# Patient Record
Sex: FEMALE | Race: BLACK OR AFRICAN AMERICAN | NOT HISPANIC OR LATINO | Employment: UNEMPLOYED | URBAN - METROPOLITAN AREA
[De-identification: names, ages, dates, MRNs, and addresses within clinical notes are randomized per-mention and may not be internally consistent; named-entity substitution may affect disease eponyms.]

---

## 2023-05-23 ENCOUNTER — APPOINTMENT (OUTPATIENT)
Dept: PRIMARY CARE | Facility: CLINIC | Age: 73
End: 2023-05-23
Payer: MEDICARE

## 2023-08-02 PROBLEM — Z86.39 H/O: OBESITY: Status: ACTIVE | Noted: 2023-08-02

## 2023-08-02 PROBLEM — K59.00 CONSTIPATION: Status: ACTIVE | Noted: 2023-08-02

## 2023-08-02 PROBLEM — R51.9 HEADACHE, ACUTE: Status: ACTIVE | Noted: 2023-08-02

## 2023-08-02 PROBLEM — R00.1 BRADYCARDIA WITH 31-40 BEATS PER MINUTE: Status: ACTIVE | Noted: 2023-08-02

## 2023-08-02 PROBLEM — E87.1 HYPONATREMIA: Status: ACTIVE | Noted: 2023-08-02

## 2023-08-02 PROBLEM — M62.81 MUSCLE WEAKNESS: Status: ACTIVE | Noted: 2023-08-02

## 2023-08-02 PROBLEM — H25.13 CATARACT, NUCLEAR SCLEROTIC, BOTH EYES: Status: ACTIVE | Noted: 2023-08-02

## 2023-08-02 PROBLEM — M26.69 TMJ CREPITUS: Status: ACTIVE | Noted: 2023-08-02

## 2023-08-02 PROBLEM — R00.2 PALPITATIONS: Status: ACTIVE | Noted: 2023-08-02

## 2023-08-02 PROBLEM — I10 ESSENTIAL HYPERTENSION: Status: ACTIVE | Noted: 2023-08-02

## 2023-08-02 PROBLEM — N89.8 VAGINAL DRYNESS: Status: ACTIVE | Noted: 2023-08-02

## 2023-08-02 PROBLEM — H04.123 DRY EYE SYNDROME, BILATERAL: Status: ACTIVE | Noted: 2023-08-02

## 2023-08-02 PROBLEM — R93.1 ELEVATED CORONARY ARTERY CALCIUM SCORE: Status: ACTIVE | Noted: 2023-08-02

## 2023-08-02 PROBLEM — R10.30 LOWER ABDOMINAL PAIN OF UNKNOWN ETIOLOGY: Status: ACTIVE | Noted: 2023-08-02

## 2023-08-02 PROBLEM — I60.8: Status: ACTIVE | Noted: 2023-08-02

## 2023-08-02 PROBLEM — R26.2 DIFFICULTY WALKING: Status: ACTIVE | Noted: 2023-08-02

## 2023-08-02 PROBLEM — E78.2 MIXED HYPERLIPIDEMIA: Status: ACTIVE | Noted: 2023-08-02

## 2023-08-02 PROBLEM — I51.7 CARDIOMEGALY: Status: ACTIVE | Noted: 2023-08-02

## 2023-08-02 PROBLEM — H93.13 SUBJECTIVE TINNITUS OF BOTH EARS: Status: ACTIVE | Noted: 2023-08-02

## 2023-08-02 PROBLEM — D64.9 LOW HEMATOCRIT: Status: ACTIVE | Noted: 2023-08-02

## 2023-08-02 PROBLEM — R13.10 DYSPHAGIA: Status: ACTIVE | Noted: 2023-08-02

## 2023-08-02 PROBLEM — R42 VERTIGO: Status: ACTIVE | Noted: 2023-08-02

## 2023-08-02 PROBLEM — R09.A2 GLOBUS SENSATION: Status: ACTIVE | Noted: 2023-08-02

## 2023-08-02 RX ORDER — MULTIVIT-MIN/IRON/FOLIC ACID/K 18-600-40
CAPSULE ORAL
COMMUNITY
Start: 2020-01-14

## 2023-08-02 RX ORDER — LISINOPRIL 20 MG/1
TABLET ORAL
COMMUNITY
Start: 2020-09-22

## 2023-08-02 RX ORDER — POLYETHYLENE GLYCOL 3350 17 G/17G
POWDER, FOR SOLUTION ORAL
COMMUNITY
Start: 2019-12-12

## 2023-08-02 RX ORDER — AMLODIPINE BESYLATE 10 MG/1
TABLET ORAL
COMMUNITY
Start: 2019-12-20 | End: 2023-09-29 | Stop reason: SDUPTHER

## 2023-08-15 ENCOUNTER — OFFICE VISIT (OUTPATIENT)
Dept: PRIMARY CARE | Facility: CLINIC | Age: 73
End: 2023-08-15
Payer: MEDICARE

## 2023-08-15 VITALS
BODY MASS INDEX: 24.53 KG/M2 | HEART RATE: 68 BPM | DIASTOLIC BLOOD PRESSURE: 94 MMHG | WEIGHT: 147.2 LBS | OXYGEN SATURATION: 100 % | SYSTOLIC BLOOD PRESSURE: 178 MMHG | HEIGHT: 65 IN

## 2023-08-15 DIAGNOSIS — D70.9 NEUTROPENIA, UNSPECIFIED TYPE (CMS-HCC): ICD-10-CM

## 2023-08-15 DIAGNOSIS — I10 ESSENTIAL HYPERTENSION: Primary | ICD-10-CM

## 2023-08-15 DIAGNOSIS — Z13.220 NEED FOR LIPID SCREENING: ICD-10-CM

## 2023-08-15 LAB
ALANINE AMINOTRANSFERASE (SGPT) (U/L) IN SER/PLAS: 18 U/L (ref 7–45)
ALBUMIN (G/DL) IN SER/PLAS: 4.6 G/DL (ref 3.4–5)
ALKALINE PHOSPHATASE (U/L) IN SER/PLAS: 70 U/L (ref 33–136)
ANION GAP IN SER/PLAS: 13 MMOL/L (ref 10–20)
ASPARTATE AMINOTRANSFERASE (SGOT) (U/L) IN SER/PLAS: 20 U/L (ref 9–39)
BILIRUBIN TOTAL (MG/DL) IN SER/PLAS: 0.7 MG/DL (ref 0–1.2)
CALCIUM (MG/DL) IN SER/PLAS: 9.9 MG/DL (ref 8.6–10.6)
CARBON DIOXIDE, TOTAL (MMOL/L) IN SER/PLAS: 27 MMOL/L (ref 21–32)
CHLORIDE (MMOL/L) IN SER/PLAS: 104 MMOL/L (ref 98–107)
CHOLESTEROL (MG/DL) IN SER/PLAS: 231 MG/DL (ref 0–199)
CHOLESTEROL IN HDL (MG/DL) IN SER/PLAS: 71.8 MG/DL
CHOLESTEROL/HDL RATIO: 3.2
CREATININE (MG/DL) IN SER/PLAS: 0.82 MG/DL (ref 0.5–1.05)
GFR FEMALE: 76 ML/MIN/1.73M2
GLUCOSE (MG/DL) IN SER/PLAS: 80 MG/DL (ref 74–99)
LDL: 146 MG/DL (ref 0–99)
POTASSIUM (MMOL/L) IN SER/PLAS: 4.3 MMOL/L (ref 3.5–5.3)
PROTEIN TOTAL: 7.2 G/DL (ref 6.4–8.2)
SODIUM (MMOL/L) IN SER/PLAS: 140 MMOL/L (ref 136–145)
TRIGLYCERIDE (MG/DL) IN SER/PLAS: 68 MG/DL (ref 0–149)
UREA NITROGEN (MG/DL) IN SER/PLAS: 12 MG/DL (ref 6–23)
VLDL: 14 MG/DL (ref 0–40)

## 2023-08-15 PROCEDURE — 1160F RVW MEDS BY RX/DR IN RCRD: CPT | Performed by: STUDENT IN AN ORGANIZED HEALTH CARE EDUCATION/TRAINING PROGRAM

## 2023-08-15 PROCEDURE — 80061 LIPID PANEL: CPT

## 2023-08-15 PROCEDURE — 85025 COMPLETE CBC W/AUTO DIFF WBC: CPT

## 2023-08-15 PROCEDURE — 3077F SYST BP >= 140 MM HG: CPT | Performed by: STUDENT IN AN ORGANIZED HEALTH CARE EDUCATION/TRAINING PROGRAM

## 2023-08-15 PROCEDURE — 3080F DIAST BP >= 90 MM HG: CPT | Performed by: STUDENT IN AN ORGANIZED HEALTH CARE EDUCATION/TRAINING PROGRAM

## 2023-08-15 PROCEDURE — 1036F TOBACCO NON-USER: CPT | Performed by: STUDENT IN AN ORGANIZED HEALTH CARE EDUCATION/TRAINING PROGRAM

## 2023-08-15 PROCEDURE — G0439 PPPS, SUBSEQ VISIT: HCPCS | Performed by: STUDENT IN AN ORGANIZED HEALTH CARE EDUCATION/TRAINING PROGRAM

## 2023-08-15 PROCEDURE — 1159F MED LIST DOCD IN RCRD: CPT | Performed by: STUDENT IN AN ORGANIZED HEALTH CARE EDUCATION/TRAINING PROGRAM

## 2023-08-15 PROCEDURE — 80053 COMPREHEN METABOLIC PANEL: CPT

## 2023-08-15 PROCEDURE — 1126F AMNT PAIN NOTED NONE PRSNT: CPT | Performed by: STUDENT IN AN ORGANIZED HEALTH CARE EDUCATION/TRAINING PROGRAM

## 2023-08-15 ASSESSMENT — ENCOUNTER SYMPTOMS
MYALGIAS: 1
DIZZINESS: 0
ABDOMINAL PAIN: 0
FEVER: 0
NAUSEA: 0
COUGH: 0
OCCASIONAL FEELINGS OF UNSTEADINESS: 0
CHILLS: 0
DYSPHORIC MOOD: 0
CONSTIPATION: 0
WHEEZING: 0
WOUND: 0
NERVOUS/ANXIOUS: 0
VOMITING: 0
BRUISES/BLEEDS EASILY: 0
ARTHRALGIAS: 0
HEADACHES: 0
DEPRESSION: 0
LOSS OF SENSATION IN FEET: 0

## 2023-08-15 NOTE — ASSESSMENT & PLAN NOTE
Patient continues to take amlodipine and lisinopril daily.  Patient has home BP log with here as well as her home cuff.  Readings are congruent with our readings in office.  Based on her results, patient is well controlled at home, typically elevated in office.

## 2023-08-15 NOTE — PROGRESS NOTES
"Subjective   Reason for Visit: Khadra Watkins is an 73 y.o. female here for a Medicare Wellness visit.          Reviewed all medications by prescribing practitioner or clinical pharmacist (such as prescriptions, OTCs, herbal therapies and supplements) and documented in the medical record.    Patient here for annual exam.  Denies any complaints at this time.     Ms. Watkins has been taking Amlodipine and Lisinopril. She has a BP cuff at home and logs her BP readings. She notes that she often has a higher BP in the clinic than when at home.     Ms. Watkins hasn't been hospitalized and doesn't have any recent injuries or illnesses.     Ms. Watkins has declined DEXA scan, mammogram, colonoscopy screenings. She declined pneumonia and Tdap vaccines.         Patient Care Team:  Anay Cobb MD as PCP - General (Family Medicine)  Anay Cobb MD as PCP - [facility] Attributed Provider     Review of Systems   Constitutional:  Negative for chills and fever.   Respiratory:  Negative for cough and wheezing.    Gastrointestinal:  Negative for abdominal pain, constipation, nausea and vomiting.   Musculoskeletal:  Positive for myalgias. Negative for arthralgias and gait problem.   Skin:  Negative for pallor, rash and wound.   Neurological:  Negative for dizziness and headaches.   Hematological:  Does not bruise/bleed easily.   Psychiatric/Behavioral:  Negative for dysphoric mood. The patient is not nervous/anxious.        Objective   Vitals:  BP (!) 178/94   Pulse 68   Ht 1.651 m (5' 5\")   Wt 66.8 kg (147 lb 3.2 oz)   SpO2 100%   BMI 24.50 kg/m²       Physical Exam  Constitutional:       Appearance: Normal appearance.   HENT:      Head: Normocephalic and atraumatic.      Right Ear: Tympanic membrane, ear canal and external ear normal.      Left Ear: Tympanic membrane, ear canal and external ear normal.      Mouth/Throat:      Mouth: Mucous membranes are moist.   Eyes:      Extraocular Movements: Extraocular movements intact.      " Conjunctiva/sclera: Conjunctivae normal.      Pupils: Pupils are equal, round, and reactive to light.   Cardiovascular:      Rate and Rhythm: Normal rate and regular rhythm.   Pulmonary:      Effort: Pulmonary effort is normal.      Breath sounds: Normal breath sounds.   Abdominal:      General: Abdomen is flat. There is no distension.      Palpations: Abdomen is soft.      Tenderness: There is no abdominal tenderness. There is no guarding.   Musculoskeletal:      Cervical back: Normal range of motion and neck supple. No tenderness.   Lymphadenopathy:      Cervical: No cervical adenopathy.   Skin:     General: Skin is warm and dry.   Neurological:      Mental Status: She is alert.      Motor: No weakness.   Psychiatric:         Mood and Affect: Mood normal.         Behavior: Behavior normal.         Assessment/Plan   Problem List Items Addressed This Visit       Essential hypertension - Primary    Current Assessment & Plan     Patient continues to take amlodipine and lisinopril daily.  Patient has home BP log with here as well as her home cuff.  Readings are congruent with our readings in office.  Based on her results, patient is well controlled at home, typically elevated in office.           Offered mammogram, DEXA, colonoscopy vs Cologard, patient declined all of these.  Additionally offered routine immunizations, which were declined as well.  RTC in 1 year, sooner PRN.     Patient seen and discussed with attending physician, Dr. Cobb

## 2023-08-16 ENCOUNTER — TELEPHONE (OUTPATIENT)
Dept: PRIMARY CARE | Facility: CLINIC | Age: 73
End: 2023-08-16
Payer: MEDICARE

## 2023-08-16 LAB
BASOPHILS (10*3/UL) IN BLOOD BY AUTOMATED COUNT: 0.02 X10E9/L (ref 0–0.1)
BASOPHILS/100 LEUKOCYTES IN BLOOD BY AUTOMATED COUNT: 0.6 % (ref 0–2)
EOSINOPHILS (10*3/UL) IN BLOOD BY AUTOMATED COUNT: 0.1 X10E9/L (ref 0–0.4)
EOSINOPHILS/100 LEUKOCYTES IN BLOOD BY AUTOMATED COUNT: 2.9 % (ref 0–6)
ERYTHROCYTE DISTRIBUTION WIDTH (RATIO) BY AUTOMATED COUNT: 12.8 % (ref 11.5–14.5)
ERYTHROCYTE MEAN CORPUSCULAR HEMOGLOBIN CONCENTRATION (G/DL) BY AUTOMATED: 32 G/DL (ref 32–36)
ERYTHROCYTE MEAN CORPUSCULAR VOLUME (FL) BY AUTOMATED COUNT: 94 FL (ref 80–100)
ERYTHROCYTES (10*6/UL) IN BLOOD BY AUTOMATED COUNT: 4.41 X10E12/L (ref 4–5.2)
HEMATOCRIT (%) IN BLOOD BY AUTOMATED COUNT: 41.5 % (ref 36–46)
HEMOGLOBIN (G/DL) IN BLOOD: 13.3 G/DL (ref 12–16)
IMMATURE GRANULOCYTES/100 LEUKOCYTES IN BLOOD BY AUTOMATED COUNT: 0.3 % (ref 0–0.9)
LEUKOCYTES (10*3/UL) IN BLOOD BY AUTOMATED COUNT: 3.5 X10E9/L (ref 4.4–11.3)
LYMPHOCYTES (10*3/UL) IN BLOOD BY AUTOMATED COUNT: 1.17 X10E9/L (ref 0.8–3)
LYMPHOCYTES/100 LEUKOCYTES IN BLOOD BY AUTOMATED COUNT: 33.5 % (ref 13–44)
MONOCYTES (10*3/UL) IN BLOOD BY AUTOMATED COUNT: 0.33 X10E9/L (ref 0.05–0.8)
MONOCYTES/100 LEUKOCYTES IN BLOOD BY AUTOMATED COUNT: 9.5 % (ref 2–10)
NEUTROPHILS (10*3/UL) IN BLOOD BY AUTOMATED COUNT: 1.86 X10E9/L (ref 1.6–5.5)
NEUTROPHILS/100 LEUKOCYTES IN BLOOD BY AUTOMATED COUNT: 53.2 % (ref 40–80)
NRBC (PER 100 WBCS) BY AUTOMATED COUNT: 0 /100 WBC (ref 0–0)
PLATELETS (10*3/UL) IN BLOOD AUTOMATED COUNT: 144 X10E9/L (ref 150–450)

## 2023-08-16 NOTE — TELEPHONE ENCOUNTER
----- Message from Fer Godoy DO sent at 8/16/2023 11:53 AM EDT -----  The metabolic panel we tested came back normal, so no concerns for effects from her blood pressure medications.  Her blood count continues to show a mildly low white blood cell count and low platelet count - could think about seeing hematology to dig   into this if she is interested.

## 2023-08-16 NOTE — RESULT ENCOUNTER NOTE
The metabolic panel we tested came back normal, so no concerns for effects from her blood pressure medications.  Her blood count continues to show a mildly low white blood cell count and low platelet count - could think about seeing hematology to dig into this if she is interested.

## 2023-08-18 NOTE — TELEPHONE ENCOUNTER
Spoke to patient     ----- Message from Fer Godoy DO sent at 8/16/2023 11:53 AM EDT -----  The metabolic panel we tested came back normal, so no concerns for effects from her blood pressure medications.  Her blood count continues to show a mildly low white blood cell count and low platelet count - could think about seeing hematology to dig   into this if she is interested.

## 2023-09-29 ENCOUNTER — TELEPHONE (OUTPATIENT)
Dept: PRIMARY CARE | Facility: CLINIC | Age: 73
End: 2023-09-29

## 2023-09-29 DIAGNOSIS — I10 ESSENTIAL HYPERTENSION: Primary | ICD-10-CM

## 2023-09-29 RX ORDER — AMLODIPINE BESYLATE 10 MG/1
10 TABLET ORAL DAILY
Qty: 90 TABLET | Refills: 0 | Status: SHIPPED | OUTPATIENT
Start: 2023-09-29 | End: 2024-05-11

## 2023-10-01 RX ORDER — AMLODIPINE BESYLATE 10 MG/1
10 TABLET ORAL DAILY
Qty: 90 TABLET | Refills: 0 | Status: CANCELLED | OUTPATIENT
Start: 2023-10-01

## 2024-04-16 DIAGNOSIS — I67.1 CEREBRAL ANEURYSM (HHS-HCC): Primary | ICD-10-CM

## 2024-04-24 ENCOUNTER — LAB (OUTPATIENT)
Dept: LAB | Facility: LAB | Age: 74
End: 2024-04-24
Payer: MEDICARE

## 2024-04-24 DIAGNOSIS — I67.1 CEREBRAL ANEURYSM (HHS-HCC): ICD-10-CM

## 2024-04-24 PROCEDURE — 82565 ASSAY OF CREATININE: CPT

## 2024-04-24 PROCEDURE — 36415 COLL VENOUS BLD VENIPUNCTURE: CPT

## 2024-04-25 LAB
CREAT SERPL-MCNC: 0.83 MG/DL (ref 0.5–1.05)
EGFRCR SERPLBLD CKD-EPI 2021: 75 ML/MIN/1.73M*2

## 2024-05-06 ENCOUNTER — HOSPITAL ENCOUNTER (INPATIENT)
Facility: HOSPITAL | Age: 74
LOS: 4 days | DRG: 283 | End: 2024-05-10
Attending: EMERGENCY MEDICINE | Admitting: EMERGENCY MEDICINE
Payer: MEDICARE

## 2024-05-06 ENCOUNTER — APPOINTMENT (OUTPATIENT)
Dept: RADIOLOGY | Facility: HOSPITAL | Age: 74
DRG: 283 | End: 2024-05-06
Payer: MEDICARE

## 2024-05-06 ENCOUNTER — APPOINTMENT (OUTPATIENT)
Dept: CARDIOLOGY | Facility: HOSPITAL | Age: 74
DRG: 283 | End: 2024-05-06
Payer: MEDICARE

## 2024-05-06 DIAGNOSIS — Z86.79 HX OF VENTRICULAR FIBRILLATION: ICD-10-CM

## 2024-05-06 DIAGNOSIS — I46.9 CARDIAC ARREST (MULTI): Primary | ICD-10-CM

## 2024-05-06 LAB
ANION GAP BLDV CALCULATED.4IONS-SCNC: 20 MMOL/L (ref 10–25)
BASE EXCESS BLDV CALC-SCNC: -11 MMOL/L (ref -2–3)
BNP SERPL-MCNC: 509 PG/ML (ref 0–99)
BODY TEMPERATURE: 37 DEGREES CELSIUS
CA-I BLDV-SCNC: 1.15 MMOL/L (ref 1.1–1.33)
CARDIAC TROPONIN I PNL SERPL HS: 185 NG/L (ref 0–13)
CARDIAC TROPONIN I PNL SERPL HS: 683 NG/L (ref 0–13)
CHLORIDE BLDV-SCNC: 98 MMOL/L (ref 98–107)
GLUCOSE BLD MANUAL STRIP-MCNC: 107 MG/DL (ref 74–99)
GLUCOSE BLD MANUAL STRIP-MCNC: 198 MG/DL (ref 74–99)
GLUCOSE BLD MANUAL STRIP-MCNC: 91 MG/DL (ref 74–99)
GLUCOSE BLDV-MCNC: 183 MG/DL (ref 74–99)
HCO3 BLDV-SCNC: 18.3 MMOL/L (ref 22–26)
HCT VFR BLD EST: 37 % (ref 36–46)
HGB BLDV-MCNC: 12.3 G/DL (ref 12–16)
INHALED O2 CONCENTRATION: 100 %
LACTATE BLDV-SCNC: 4.4 MMOL/L (ref 0.4–2)
LACTATE BLDV-SCNC: 8.2 MMOL/L (ref 0.4–2)
OXYHGB MFR BLDV: 73.5 % (ref 45–75)
PCO2 BLDV: 55 MM HG (ref 41–51)
PH BLDV: 7.13 PH (ref 7.33–7.43)
PO2 BLDV: 53 MM HG (ref 35–45)
POTASSIUM BLDV-SCNC: 3.8 MMOL/L (ref 3.5–5.3)
SAO2 % BLDV: 75 % (ref 45–75)
SODIUM BLDV-SCNC: 132 MMOL/L (ref 136–145)

## 2024-05-06 PROCEDURE — 2500000005 HC RX 250 GENERAL PHARMACY W/O HCPCS: Performed by: STUDENT IN AN ORGANIZED HEALTH CARE EDUCATION/TRAINING PROGRAM

## 2024-05-06 PROCEDURE — 2500000004 HC RX 250 GENERAL PHARMACY W/ HCPCS (ALT 636 FOR OP/ED)

## 2024-05-06 PROCEDURE — 83605 ASSAY OF LACTIC ACID: CPT | Performed by: EMERGENCY MEDICINE

## 2024-05-06 PROCEDURE — 84132 ASSAY OF SERUM POTASSIUM: CPT | Performed by: EMERGENCY MEDICINE

## 2024-05-06 PROCEDURE — 2500000004 HC RX 250 GENERAL PHARMACY W/ HCPCS (ALT 636 FOR OP/ED): Performed by: ANESTHESIOLOGY

## 2024-05-06 PROCEDURE — 85025 COMPLETE CBC W/AUTO DIFF WBC: CPT | Performed by: EMERGENCY MEDICINE

## 2024-05-06 PROCEDURE — 82947 ASSAY GLUCOSE BLOOD QUANT: CPT

## 2024-05-06 PROCEDURE — 2550000001 HC RX 255 CONTRASTS: Performed by: EMERGENCY MEDICINE

## 2024-05-06 PROCEDURE — 2500000005 HC RX 250 GENERAL PHARMACY W/O HCPCS: Performed by: EMERGENCY MEDICINE

## 2024-05-06 PROCEDURE — 99291 CRITICAL CARE FIRST HOUR: CPT | Performed by: STUDENT IN AN ORGANIZED HEALTH CARE EDUCATION/TRAINING PROGRAM

## 2024-05-06 PROCEDURE — 2500000001 HC RX 250 WO HCPCS SELF ADMINISTERED DRUGS (ALT 637 FOR MEDICARE OP): Performed by: NURSE PRACTITIONER

## 2024-05-06 PROCEDURE — 70496 CT ANGIOGRAPHY HEAD: CPT | Performed by: RADIOLOGY

## 2024-05-06 PROCEDURE — 36415 COLL VENOUS BLD VENIPUNCTURE: CPT | Performed by: EMERGENCY MEDICINE

## 2024-05-06 PROCEDURE — 70498 CT ANGIOGRAPHY NECK: CPT

## 2024-05-06 PROCEDURE — 94002 VENT MGMT INPAT INIT DAY: CPT

## 2024-05-06 PROCEDURE — 2500000004 HC RX 250 GENERAL PHARMACY W/ HCPCS (ALT 636 FOR OP/ED): Performed by: STUDENT IN AN ORGANIZED HEALTH CARE EDUCATION/TRAINING PROGRAM

## 2024-05-06 PROCEDURE — 99291 CRITICAL CARE FIRST HOUR: CPT | Performed by: EMERGENCY MEDICINE

## 2024-05-06 PROCEDURE — 93005 ELECTROCARDIOGRAM TRACING: CPT

## 2024-05-06 PROCEDURE — 2500000004 HC RX 250 GENERAL PHARMACY W/ HCPCS (ALT 636 FOR OP/ED): Performed by: EMERGENCY MEDICINE

## 2024-05-06 PROCEDURE — 70450 CT HEAD/BRAIN W/O DYE: CPT | Performed by: RADIOLOGY

## 2024-05-06 PROCEDURE — 70450 CT HEAD/BRAIN W/O DYE: CPT

## 2024-05-06 PROCEDURE — 83880 ASSAY OF NATRIURETIC PEPTIDE: CPT | Performed by: EMERGENCY MEDICINE

## 2024-05-06 PROCEDURE — 71045 X-RAY EXAM CHEST 1 VIEW: CPT

## 2024-05-06 PROCEDURE — 2020000001 HC ICU ROOM DAILY

## 2024-05-06 PROCEDURE — 94799 UNLISTED PULMONARY SVC/PX: CPT

## 2024-05-06 PROCEDURE — 5A1945Z RESPIRATORY VENTILATION, 24-96 CONSECUTIVE HOURS: ICD-10-PCS | Performed by: EMERGENCY MEDICINE

## 2024-05-06 PROCEDURE — 84484 ASSAY OF TROPONIN QUANT: CPT | Performed by: EMERGENCY MEDICINE

## 2024-05-06 PROCEDURE — 70498 CT ANGIOGRAPHY NECK: CPT | Performed by: RADIOLOGY

## 2024-05-06 PROCEDURE — 94681 O2 UPTK CO2 OUTP % O2 XTRC: CPT

## 2024-05-06 RX ORDER — PROPOFOL 10 MG/ML
INJECTION, EMULSION INTRAVENOUS
Status: COMPLETED
Start: 2024-05-06 | End: 2024-05-06

## 2024-05-06 RX ORDER — DEXTROSE 50 % IN WATER (D50W) INTRAVENOUS SYRINGE
12.5
Status: DISCONTINUED | OUTPATIENT
Start: 2024-05-06 | End: 2024-05-09

## 2024-05-06 RX ORDER — BUSPIRONE HYDROCHLORIDE 10 MG/1
30 TABLET ORAL EVERY 8 HOURS SCHEDULED
Status: COMPLETED | OUTPATIENT
Start: 2024-05-06 | End: 2024-05-08

## 2024-05-06 RX ORDER — HEPARIN SODIUM 10000 [USP'U]/100ML
0-4000 INJECTION, SOLUTION INTRAVENOUS CONTINUOUS
Status: DISCONTINUED | OUTPATIENT
Start: 2024-05-06 | End: 2024-05-08

## 2024-05-06 RX ORDER — ACETAMINOPHEN 160 MG/5ML
650 SOLUTION ORAL EVERY 6 HOURS SCHEDULED
Status: COMPLETED | OUTPATIENT
Start: 2024-05-07 | End: 2024-05-08

## 2024-05-06 RX ORDER — POLYETHYLENE GLYCOL 3350 17 G/17G
17 POWDER, FOR SOLUTION ORAL DAILY
Status: DISCONTINUED | OUTPATIENT
Start: 2024-05-07 | End: 2024-05-07

## 2024-05-06 RX ORDER — SODIUM CHLORIDE, SODIUM LACTATE, POTASSIUM CHLORIDE, CALCIUM CHLORIDE 600; 310; 30; 20 MG/100ML; MG/100ML; MG/100ML; MG/100ML
50 INJECTION, SOLUTION INTRAVENOUS CONTINUOUS
Status: DISCONTINUED | OUTPATIENT
Start: 2024-05-06 | End: 2024-05-08

## 2024-05-06 RX ORDER — DEXTROSE 50 % IN WATER (D50W) INTRAVENOUS SYRINGE
25
Status: DISCONTINUED | OUTPATIENT
Start: 2024-05-06 | End: 2024-05-09

## 2024-05-06 RX ORDER — HEPARIN SODIUM 5000 [USP'U]/ML
5000 INJECTION, SOLUTION INTRAVENOUS; SUBCUTANEOUS EVERY 8 HOURS
Status: DISCONTINUED | OUTPATIENT
Start: 2024-05-06 | End: 2024-05-06

## 2024-05-06 RX ORDER — INSULIN LISPRO 100 [IU]/ML
0-5 INJECTION, SOLUTION INTRAVENOUS; SUBCUTANEOUS EVERY 4 HOURS
Status: DISCONTINUED | OUTPATIENT
Start: 2024-05-06 | End: 2024-05-09

## 2024-05-06 RX ORDER — POLYETHYLENE GLYCOL 3350 17 G/17G
17 POWDER, FOR SOLUTION ORAL DAILY
Status: DISCONTINUED | OUTPATIENT
Start: 2024-05-06 | End: 2024-05-06

## 2024-05-06 RX ORDER — PROPOFOL 10 MG/ML
5-60 INJECTION, EMULSION INTRAVENOUS CONTINUOUS
Status: DISCONTINUED | OUTPATIENT
Start: 2024-05-06 | End: 2024-05-09

## 2024-05-06 RX ADMIN — SODIUM CHLORIDE 1000 ML: 9 INJECTION, SOLUTION INTRAVENOUS at 17:45

## 2024-05-06 RX ADMIN — IOHEXOL 75 ML: 350 INJECTION, SOLUTION INTRAVENOUS at 16:45

## 2024-05-06 RX ADMIN — Medication 50 PERCENT: at 16:25

## 2024-05-06 RX ADMIN — PROPOFOL 20 MCG/KG/MIN: 10 INJECTION, EMULSION INTRAVENOUS at 19:15

## 2024-05-06 RX ADMIN — HEPARIN SODIUM 5000 UNITS: 5000 INJECTION INTRAVENOUS; SUBCUTANEOUS at 18:24

## 2024-05-06 RX ADMIN — BUSPIRONE HYDROCHLORIDE 30 MG: 10 TABLET ORAL at 22:10

## 2024-05-06 RX ADMIN — SODIUM CHLORIDE, POTASSIUM CHLORIDE, SODIUM LACTATE AND CALCIUM CHLORIDE 50 ML/HR: 600; 310; 30; 20 INJECTION, SOLUTION INTRAVENOUS at 18:24

## 2024-05-06 RX ADMIN — Medication 50 PERCENT: at 19:15

## 2024-05-06 RX ADMIN — Medication 50 PERCENT: at 20:00

## 2024-05-06 RX ADMIN — HEPARIN SODIUM 865 UNITS/HR: 10000 INJECTION, SOLUTION INTRAVENOUS at 19:55

## 2024-05-06 SDOH — SOCIAL STABILITY: SOCIAL INSECURITY: ARE THERE ANY APPARENT SIGNS OF INJURIES/BEHAVIORS THAT COULD BE RELATED TO ABUSE/NEGLECT?: UNABLE TO ASSESS

## 2024-05-06 SDOH — SOCIAL STABILITY: SOCIAL INSECURITY: DO YOU FEEL ANYONE HAS EXPLOITED OR TAKEN ADVANTAGE OF YOU FINANCIALLY OR OF YOUR PERSONAL PROPERTY?: UNABLE TO ASSESS

## 2024-05-06 SDOH — SOCIAL STABILITY: SOCIAL INSECURITY: ABUSE: ADULT

## 2024-05-06 SDOH — SOCIAL STABILITY: SOCIAL INSECURITY: HAVE YOU HAD THOUGHTS OF HARMING ANYONE ELSE?: UNABLE TO ASSESS

## 2024-05-06 SDOH — SOCIAL STABILITY: SOCIAL INSECURITY: WERE YOU ABLE TO COMPLETE ALL THE BEHAVIORAL HEALTH SCREENINGS?: NO

## 2024-05-06 SDOH — SOCIAL STABILITY: SOCIAL INSECURITY: HAS ANYONE EVER THREATENED TO HURT YOUR FAMILY OR YOUR PETS?: UNABLE TO ASSESS

## 2024-05-06 SDOH — SOCIAL STABILITY: SOCIAL INSECURITY: DO YOU FEEL UNSAFE GOING BACK TO THE PLACE WHERE YOU ARE LIVING?: UNABLE TO ASSESS

## 2024-05-06 SDOH — SOCIAL STABILITY: SOCIAL INSECURITY: DOES ANYONE TRY TO KEEP YOU FROM HAVING/CONTACTING OTHER FRIENDS OR DOING THINGS OUTSIDE YOUR HOME?: UNABLE TO ASSESS

## 2024-05-06 SDOH — SOCIAL STABILITY: SOCIAL INSECURITY: ARE YOU OR HAVE YOU BEEN THREATENED OR ABUSED PHYSICALLY, EMOTIONALLY, OR SEXUALLY BY ANYONE?: UNABLE TO ASSESS

## 2024-05-06 SDOH — SOCIAL STABILITY: SOCIAL INSECURITY: HAVE YOU HAD ANY THOUGHTS OF HARMING ANYONE ELSE?: UNABLE TO ASSESS

## 2024-05-06 ASSESSMENT — COGNITIVE AND FUNCTIONAL STATUS - GENERAL
MOBILITY SCORE: 6
STANDING UP FROM CHAIR USING ARMS: TOTAL
PATIENT BASELINE BEDBOUND: NO
EATING MEALS: TOTAL
MOVING FROM LYING ON BACK TO SITTING ON SIDE OF FLAT BED WITH BEDRAILS: TOTAL
MOVING TO AND FROM BED TO CHAIR: TOTAL
DRESSING REGULAR LOWER BODY CLOTHING: TOTAL
DRESSING REGULAR UPPER BODY CLOTHING: TOTAL
CLIMB 3 TO 5 STEPS WITH RAILING: TOTAL
DAILY ACTIVITIY SCORE: 24
HELP NEEDED FOR BATHING: TOTAL
WALKING IN HOSPITAL ROOM: TOTAL
TOILETING: TOTAL
MOBILITY SCORE: 24
TURNING FROM BACK TO SIDE WHILE IN FLAT BAD: TOTAL
PERSONAL GROOMING: TOTAL
DAILY ACTIVITIY SCORE: 6

## 2024-05-06 ASSESSMENT — PAIN - FUNCTIONAL ASSESSMENT: PAIN_FUNCTIONAL_ASSESSMENT: UNABLE TO SELF-REPORT

## 2024-05-06 ASSESSMENT — ACTIVITIES OF DAILY LIVING (ADL)
PATIENT'S MEMORY ADEQUATE TO SAFELY COMPLETE DAILY ACTIVITIES?: UNABLE TO ASSESS
FEEDING YOURSELF: UNABLE TO ASSESS
JUDGMENT_ADEQUATE_SAFELY_COMPLETE_DAILY_ACTIVITIES: UNABLE TO ASSESS
TOILETING: UNABLE TO ASSESS
GROOMING: UNABLE TO ASSESS
BATHING: UNABLE TO ASSESS
DRESSING YOURSELF: UNABLE TO ASSESS
LACK_OF_TRANSPORTATION: PATIENT UNABLE TO ANSWER
ADEQUATE_TO_COMPLETE_ADL: UNABLE TO ASSESS
HEARING - LEFT EAR: UNABLE TO ASSESS
HEARING - RIGHT EAR: UNABLE TO ASSESS
WALKS IN HOME: UNABLE TO ASSESS

## 2024-05-06 ASSESSMENT — LIFESTYLE VARIABLES
AUDIT-C TOTAL SCORE: -1
AUDIT-C TOTAL SCORE: -1
HOW OFTEN DO YOU HAVE A DRINK CONTAINING ALCOHOL: PATIENT UNABLE TO ANSWER
SKIP TO QUESTIONS 9-10: 0
HOW MANY STANDARD DRINKS CONTAINING ALCOHOL DO YOU HAVE ON A TYPICAL DAY: PATIENT UNABLE TO ANSWER
HOW OFTEN DO YOU HAVE 6 OR MORE DRINKS ON ONE OCCASION: PATIENT UNABLE TO ANSWER

## 2024-05-06 NOTE — H&P
History Of Present Illness  Khadra Velazquez is a 74 y.o. female presenting with cardiac arrest. She was found down next to riding lawnmower by neighbors who called EMS. Unknown downtime prior to EMS arrival, CPR and ACLS initiated upon EMS arrival for total of 3 rounds of defibrillation, 2 rounds of epi, 1 dose amiodarone 150mg, and ETT intubation in the field prior to ROSC. PMHx obtained by son - HTN, palpitations, aneurysm s/p coiling ~4yrs ago. ED workup demonstrated significant diffuse ST depression on EKG for which interventional cardiology team was activated; given patient's neurologic function, they will defer emergent cath lab procedure at this time. Pt admitted to [facility] ICU for further critical care management of cardiac arrest.     Past Medical History  No past medical history on file.    Surgical History  No past surgical history on file.     Social History  She has no history on file for tobacco use, alcohol use, and drug use.    Family History  No family history on file.     Allergies  Patient has no known allergies.    Review of Systems  Unable to assess; sedated and intubated     Physical Exam  Constitutional:       Comments: Elderly female laying in ED bed, son Sánchez at bedside.   Eyes:      Comments: Pupils 1mm equal but sluggish to light   Cardiovascular:      Rate and Rhythm: Normal rate and regular rhythm.   Pulmonary:      Comments: Intubated  Abdominal:      General: There is no distension.      Palpations: Abdomen is soft.   Skin:     General: Skin is warm and dry.   Neurological:      Comments: Decorticate posturing. No corneal reflexes. (+) cough with deep suction, breathing over set rate on ventilator        Last Recorded Vitals  Blood pressure 104/68, pulse 61, temperature 36.4 °C (97.5 °F), temperature source Bladder, resp. rate 20, SpO2 100%.    Relevant Results  Scheduled medications  oxygen, , inhalation, Continuous - Inhalation  sodium chloride, 1,000 mL, intravenous,  Once      Continuous medications     PRN medications      LABS:  CMP:  Results from last 7 days   Lab Units 05/06/24  1558   SODIUM mmol/L 136   POTASSIUM mmol/L 3.7   CHLORIDE mmol/L 100   CO2 mmol/L 19*   ANION GAP mmol/L 21*   BUN mg/dL 16   CREATININE mg/dL 1.03   EGFR mL/min/1.73m*2 61   ALBUMIN g/dL 3.8   ALT U/L 78*   AST U/L 71*   BILIRUBIN TOTAL mg/dL 0.3     CBC:  Results from last 7 days   Lab Units 05/06/24  1558   WBC AUTO x10*3/uL 6.0   HEMOGLOBIN g/dL 11.9*   HEMATOCRIT % 36.8   PLATELETS AUTO x10*3/uL 163   MCV fL 92     COAG:     HEME/ENDO:     CARDIAC:   Results from last 7 days   Lab Units 05/06/24  1704 05/06/24  1558   TROPHS ng/L 683* 185*      Assessment/Plan   Principal Problem:    Cardiac arrest (Multi)    Khadra Velazquez is a 74 y.o. female presenting with cardiac arrest. She was found down next to riding lawnmower by neighbors who called EMS. Unknown downtime prior to EMS arrival, CPR and ACLS initiated upon EMS arrival for total of 3 rounds of defibrillation, 2 rounds of epi, 1 dose amiodarone 150mg, and ETT intubation in the field prior to ROSC. PMHx obtained by son - HTN, palpitations, aneurysm s/p coiling ~4yrs ago. ED workup demonstrated significant diffuse ST depression on EKG for which interventional cardiology team was activated; given patient's neurologic function, they will defer emergent cath lab procedure at this time. Pt admitted to [facility] ICU for further critical care management of cardiac arrest.    NEURO:   # Acute metabolic encephalopathy 2/2 cardiac arrest  # Aneurysm s/p coiling  - Serial neuro and pain assessments  - A-F bundle  - Neuroprotective measures including avoidance of hyperthermia. No indication for TTM  - Currently not requiring sedation  - Neurology consulted    CV:   # Vfib arrest  # Suspected severe CAD / LM disease given EKG  # H/O palpitations  # HTN  Uptrending troponins  - Tele, NIBP  - Trend troponin  - Interventional cardiology following  - Cardiology  consulted    PULM:   # Acute respiratory failure 2/2 arrest requiring intubation  - Wean FiO2 to SpO2 > 92%  - VAP bundle    GI:   - NPO  - GI ppx: PPI    /Renal:   Preserved renal function  - RFP daily, replete lytes per protocol  - Taylor, strict I&Os    HEME:   - CBC daily  - SCDs, SQH for DVTppx    ENDO:   - POCT BG, ISS q4  - Maintain BG < 180    ID:   - Trend temp, WBCs    Dispo: admit to ICU       I spent 90 minutes in the professional and overall critical care of this patient.      Kelsie Darling PA-C

## 2024-05-06 NOTE — ED TRIAGE NOTES
Pt BIBA with the c/o cardiac arrest. Per EMS pt was cutting her lawn and then found on the ground next to her riding tractor. Pt was in V-fib upon EMS arrival 3 shocks delivered, 2 rounds of Epi, 150 mg of amiodarone administered. Pt has an 18 G L-AC.   Pt was intubated in the field 7.0 tube 23cm at the teeth.

## 2024-05-06 NOTE — ED NOTES
Portable CXR completed, ET-tube advanced 1 cm at this time. New placement of tube 24cm at the teeth.      Shashank Joaquin RN  05/06/24 9543

## 2024-05-06 NOTE — ED PROCEDURE NOTE
Procedure  Critical Care    Performed by: Yulissa Deng MD  Authorized by: Yulissa Deng MD    Critical care provider statement:     Critical care time (minutes):  60    Critical care was necessary to treat or prevent imminent or life-threatening deterioration of the following conditions:  Cardiac failure (post ROSC)    Critical care was time spent personally by me on the following activities:  Blood draw for specimens, development of treatment plan with patient or surrogate, evaluation of patient's response to treatment, ordering and review of laboratory studies, ordering and review of radiographic studies, pulse oximetry, re-evaluation of patient's condition and review of old charts               Yulissa Deng MD  05/06/24 8186

## 2024-05-06 NOTE — ED PROVIDER NOTES
HPI   Chief Complaint   Patient presents with    Cardiac Arrest       HPI  Patient is presumably 65-year-old unknown female who presented to the emergency room as a Skylar Yee after cardiac arrest.  Per EMS report patient was found in her front yard with unknown downtime by a neighbor.  They said that her lawnmowing machine was going and she was on the ground unresponsive on their arrival and pulseless.  For them she was in V-fib arrest and they gave 3 rounds of defibrillation the last of which was at 360 J.  They gave 2 rounds of epi and 1 dose of amiodarone 150 mg prior to obtaining ROSC.  She has an ET tube in place and has been exhibiting good capnography.  Blood sugar was 195.  She arrives unresponsive, intubated and without further information or family members.      PMHx: Unknown   PSHx: Unknown  FamilyHx: Unknown  SocialHx: Unknown  Allergies: Unknown  Medications: Unknown    ROS  As above otherwise unable to obtain    Physical Exam    GENERAL: Intubated and unresponsive  HEENT: AT/NC, pupils are 2 mm and unresponsive but equal.  Normal Oropharynx, No Signs of Dehydration  NECK: ET tube in place.  Normal Inspection, No JVD  CARDIOVASCULAR: RRR, No M/R/G  RESPIRATORY: CTA Bilaterally, No Wheezes, Rales or Rhonchi, Chest Wall Non-tender  ABDOMEN: Soft, non-tender abdomen, Normal Bowel Sounds, No Distention  BACK: No CVA Tenderness  SKIN: Normal Color, Warm, Dry, No Rashes   EXTREMITIES: Non-Tender, Full ROM, No Pedal Edema  NEURO: Patient is not responding to noxious stimulation.  Negative pupillary reflex.    Nursing Assessment and Vitals Reviewed    EKG on arrival showed a sinus rhythm at 71 bpm.  There are ST depressions in inferior lateral leads with T wave inversions in leads III, II and aVF.  There is a rightward axis.  There is an intraventricular block.  No ST elevation is noted.    Medical Decision  Patient is presumably 65-year-old unknown female who presented to the emergency room as a Skylar Yee after  cardiac arrest.  Per EMS report patient was found in her front yard with unknown downtime by a neighbor.  They said that her lawnmowing machine was going and she was on the ground unresponsive on their arrival and pulseless.  For them she was in V-fib arrest and they gave 3 rounds of defibrillation the last of which was at 360 J.  They gave 2 rounds of epi and 1 dose of amiodarone 150 mg prior to obtaining ROSC.  She has an ET tube in place and has been exhibiting good capnography.  Blood sugar was 195.  She arrives unresponsive, intubated and without further information or family members.    Immediately upon obtaining EKG I activated Cath Lab.  Dr. Gillombardo evaluated patient at bedside in the ED.  She is on responsive with unknown downtime, no bystander CPR per report she does not meet criteria for Cath Lab at this time.    Son arrived at bedside later to provide additional history.  According to son patient was in her usual state of health this morning without any complaints.  She does have a history of coiled aneurysm, hypertension and palpitations.  He is unsure as to whether she is on anticoagulation.  He then states that he was called by a neighbor that noticed that she was unconscious in her front yard.  No report of last known normal, bystander CPR.  According to our conversation patient is full code.    This time given new information as well as the fact that patient is not currently going to the Cath Lab emergently stroke alert was called to obtain stat imaging of head to rule out intracranial hemorrhage and proceed with anticoagulation due to concern for possible NSTEMI.     Workup revealed a troponin of 185 with a repeat at 683.  BNP is elevated, AST and ALT slightly elevated.  Anion gap is 21 and bicarb is 19.  And VBG shows acidosis with a pH of 7.13 and a lactate of 8.2.    Patient began to show signs of decorticate posturing in the ED after CT imaging obtained while awaiting results.  Pupils  remain small, minimally reactive without signs of anisocoria.  She continues to show no signs of withdrawal to noxious stimulus and remains intubated without need for sedation at this time.  Blood pressure initially soft but currently improving on IV hydration.    Patient was discussed with Dr. Canotr, ICU who presented to bedside and evaluated patient.  No further recommendations at this time pending CT imaging.  Accepts patient to the ICU.    CT head and CTA head and neck did not reveal any acute intercranial bleed.  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  CTA showed status post clipping or coiling as expected per report.     Patient is discussed with cardiology and heparinization and orders attempted, however, patient already and route to the ICU.  Intensivist was updated on results, discussion with cardiology.     Please note shortly prior to admission to ICU patient was noted to have slight movement of bilateral lower extremities.  Still tolerating intubation without difficulty. ICU updated.                              No data recorded                   Patient History   No past medical history on file.  No past surgical history on file.  No family history on file.  Social History     Tobacco Use    Smoking status: Not on file    Smokeless tobacco: Not on file   Substance Use Topics    Alcohol use: Not on file    Drug use: Not on file       Physical Exam   ED Triage Vitals [05/06/24 1548]   Temp Heart Rate Respirations BP   -- 75 18 85/69      Pulse Ox Temp src Heart Rate Source Patient Position   100 % -- -- --      BP Location FiO2 (%)     -- 100 %       Physical Exam    ED Course & MDM   Diagnoses as of 05/06/24 1604   Cardiac arrest (Multi)   Hx of ventricular fibrillation       Medical Decision Making      Procedure  Procedures     Yulissa Deng,  MD  05/06/24 3170

## 2024-05-06 NOTE — ED TRIAGE NOTES
Patient found in yard next to running lawnmower, unknown downtime, no cpr prior to EMS arrival. V-fib, 3 shocks delivered, 2 doses of epi, amiodarone drip, ROSC PTA.

## 2024-05-06 NOTE — ED NOTES
RN returned from CT with patient, pt appears to be posturing. MD called to bedside.     Shashank Joaquin RN  05/06/24 3496

## 2024-05-07 ENCOUNTER — APPOINTMENT (OUTPATIENT)
Dept: CARDIOLOGY | Facility: HOSPITAL | Age: 74
DRG: 283 | End: 2024-05-07
Payer: MEDICARE

## 2024-05-07 ENCOUNTER — APPOINTMENT (OUTPATIENT)
Dept: RADIOLOGY | Facility: CLINIC | Age: 74
End: 2024-05-07
Payer: MEDICARE

## 2024-05-07 LAB
ALBUMIN SERPL BCP-MCNC: 3.8 G/DL (ref 3.4–5)
ALBUMIN SERPL BCP-MCNC: 3.8 G/DL (ref 3.4–5)
ALBUMIN SERPL BCP-MCNC: 3.9 G/DL (ref 3.4–5)
ALP SERPL-CCNC: 54 U/L (ref 33–136)
ALP SERPL-CCNC: 61 U/L (ref 33–136)
ALT SERPL W P-5'-P-CCNC: 146 U/L (ref 7–45)
ALT SERPL W P-5'-P-CCNC: 78 U/L (ref 7–45)
ANION GAP BLDA CALCULATED.4IONS-SCNC: 12 MMO/L (ref 10–25)
ANION GAP SERPL CALC-SCNC: 17 MMOL/L (ref 10–20)
ANION GAP SERPL CALC-SCNC: 20 MMOL/L (ref 10–20)
ANION GAP SERPL CALC-SCNC: 21 MMOL/L (ref 10–20)
AORTIC VALVE MEAN GRADIENT: 4 MMHG
AORTIC VALVE PEAK VELOCITY: 1.45 M/S
APPEARANCE UR: CLEAR
ARTERIAL PATENCY WRIST A: POSITIVE
AST SERPL W P-5'-P-CCNC: 188 U/L (ref 9–39)
AST SERPL W P-5'-P-CCNC: 71 U/L (ref 9–39)
ATRIAL RATE: 48 BPM
ATRIAL RATE: 71 BPM
AV PEAK GRADIENT: 8.4 MMHG
AVA (PEAK VEL): 1.51 CM2
AVA (VTI): 1.82 CM2
BASE EXCESS BLDA CALC-SCNC: -3.5 MMOL/L (ref -2–3)
BASOPHILS # BLD AUTO: 0.02 X10*3/UL (ref 0–0.1)
BASOPHILS NFR BLD AUTO: 0.3 %
BILIRUB SERPL-MCNC: 0.3 MG/DL (ref 0–1.2)
BILIRUB SERPL-MCNC: 0.6 MG/DL (ref 0–1.2)
BILIRUB UR STRIP.AUTO-MCNC: NEGATIVE MG/DL
BODY TEMPERATURE: 37 DEGREES CELSIUS
BUN SERPL-MCNC: 16 MG/DL (ref 6–23)
BUN SERPL-MCNC: 19 MG/DL (ref 6–23)
BUN SERPL-MCNC: 21 MG/DL (ref 6–23)
CA-I BLD-SCNC: 0.96 MMOL/L (ref 1.1–1.33)
CA-I BLD-SCNC: 1.02 MMOL/L (ref 1.1–1.33)
CA-I BLDA-SCNC: 1.08 MMOL/L (ref 1.1–1.33)
CALCIUM SERPL-MCNC: 8.3 MG/DL (ref 8.6–10.3)
CALCIUM SERPL-MCNC: 8.3 MG/DL (ref 8.6–10.3)
CALCIUM SERPL-MCNC: 8.7 MG/DL (ref 8.6–10.3)
CARDIAC TROPONIN I PNL SERPL HS: 4287 NG/L (ref 0–13)
CHLORIDE BLDA-SCNC: 102 MMOL/L (ref 98–107)
CHLORIDE SERPL-SCNC: 100 MMOL/L (ref 98–107)
CHLORIDE SERPL-SCNC: 101 MMOL/L (ref 98–107)
CHLORIDE SERPL-SCNC: 101 MMOL/L (ref 98–107)
CHOLEST SERPL-MCNC: 194 MG/DL (ref 0–199)
CHOLESTEROL/HDL RATIO: 3
CO2 SERPL-SCNC: 19 MMOL/L (ref 21–32)
CO2 SERPL-SCNC: 20 MMOL/L (ref 21–32)
CO2 SERPL-SCNC: 23 MMOL/L (ref 21–32)
COLOR UR: YELLOW
CREAT SERPL-MCNC: 0.83 MG/DL (ref 0.5–1.05)
CREAT SERPL-MCNC: 0.96 MG/DL (ref 0.5–1.05)
CREAT SERPL-MCNC: 1.03 MG/DL (ref 0.5–1.05)
EGFRCR SERPLBLD CKD-EPI 2021: 58 ML/MIN/1.73M*2
EGFRCR SERPLBLD CKD-EPI 2021: 63 ML/MIN/1.73M*2
EGFRCR SERPLBLD CKD-EPI 2021: 75 ML/MIN/1.73M*2
EJECTION FRACTION APICAL 4 CHAMBER: 61
EOSINOPHIL # BLD AUTO: 0.08 X10*3/UL (ref 0–0.4)
EOSINOPHIL NFR BLD AUTO: 1.3 %
ERYTHROCYTE [DISTWIDTH] IN BLOOD BY AUTOMATED COUNT: 12.6 % (ref 11.5–14.5)
ERYTHROCYTE [DISTWIDTH] IN BLOOD BY AUTOMATED COUNT: 12.8 % (ref 11.5–14.5)
ERYTHROCYTE [DISTWIDTH] IN BLOOD BY AUTOMATED COUNT: 12.9 % (ref 11.5–14.5)
ERYTHROCYTE [DISTWIDTH] IN BLOOD BY AUTOMATED COUNT: 12.9 % (ref 11.5–14.5)
EST. AVERAGE GLUCOSE BLD GHB EST-MCNC: 105 MG/DL
GLUCOSE BLD MANUAL STRIP-MCNC: 103 MG/DL (ref 74–99)
GLUCOSE BLD MANUAL STRIP-MCNC: 104 MG/DL (ref 74–99)
GLUCOSE BLD MANUAL STRIP-MCNC: 110 MG/DL (ref 74–99)
GLUCOSE BLD MANUAL STRIP-MCNC: 136 MG/DL (ref 74–99)
GLUCOSE BLD MANUAL STRIP-MCNC: 88 MG/DL (ref 74–99)
GLUCOSE BLDA-MCNC: 136 MG/DL (ref 74–99)
GLUCOSE SERPL-MCNC: 105 MG/DL (ref 74–99)
GLUCOSE SERPL-MCNC: 119 MG/DL (ref 74–99)
GLUCOSE SERPL-MCNC: 192 MG/DL (ref 74–99)
GLUCOSE UR STRIP.AUTO-MCNC: NORMAL MG/DL
HBA1C MFR BLD: 5.3 %
HCO3 BLDA-SCNC: 20.3 MMOL/L (ref 22–26)
HCT VFR BLD AUTO: 28.6 % (ref 36–46)
HCT VFR BLD AUTO: 36.8 % (ref 36–46)
HCT VFR BLD AUTO: 40.2 % (ref 36–46)
HCT VFR BLD AUTO: 41.2 % (ref 36–46)
HCT VFR BLD EST: 38 % (ref 36–46)
HDLC SERPL-MCNC: 64.3 MG/DL
HGB BLD-MCNC: 10.1 G/DL (ref 12–16)
HGB BLD-MCNC: 11.9 G/DL (ref 12–16)
HGB BLD-MCNC: 12.9 G/DL (ref 12–16)
HGB BLD-MCNC: 13.4 G/DL (ref 12–16)
HGB BLDA-MCNC: 12.6 G/DL (ref 12–16)
HOLD SPECIMEN: NORMAL
IMM GRANULOCYTES # BLD AUTO: 0.1 X10*3/UL (ref 0–0.5)
IMM GRANULOCYTES NFR BLD AUTO: 1.7 % (ref 0–0.9)
INHALED O2 CONCENTRATION: 40 %
KETONES UR STRIP.AUTO-MCNC: NEGATIVE MG/DL
LACTATE BLDA-SCNC: 2 MMOL/L (ref 0.4–2)
LACTATE SERPL-SCNC: 4.1 MMOL/L (ref 0.4–2)
LACTATE SERPL-SCNC: 4.1 MMOL/L (ref 0.4–2)
LDLC SERPL CALC-MCNC: 117 MG/DL
LEFT ATRIUM VOLUME AREA LENGTH INDEX BSA: 61.7 ML/M2
LEFT VENTRICLE INTERNAL DIMENSION DIASTOLE: 4.8 CM (ref 3.5–6)
LEFT VENTRICULAR OUTFLOW TRACT DIAMETER: 2 CM
LEUKOCYTE ESTERASE UR QL STRIP.AUTO: NEGATIVE
LV EJECTION FRACTION BIPLANE: 60 %
LYMPHOCYTES # BLD AUTO: 2.94 X10*3/UL (ref 0.8–3)
LYMPHOCYTES NFR BLD AUTO: 48.7 %
MAGNESIUM SERPL-MCNC: 2 MG/DL (ref 1.6–2.4)
MCH RBC QN AUTO: 29.7 PG (ref 26–34)
MCH RBC QN AUTO: 29.9 PG (ref 26–34)
MCH RBC QN AUTO: 30.6 PG (ref 26–34)
MCH RBC QN AUTO: 32 PG (ref 26–34)
MCHC RBC AUTO-ENTMCNC: 32.1 G/DL (ref 32–36)
MCHC RBC AUTO-ENTMCNC: 32.3 G/DL (ref 32–36)
MCHC RBC AUTO-ENTMCNC: 32.5 G/DL (ref 32–36)
MCHC RBC AUTO-ENTMCNC: 35.3 G/DL (ref 32–36)
MCV RBC AUTO: 91 FL (ref 80–100)
MCV RBC AUTO: 92 FL (ref 80–100)
MCV RBC AUTO: 92 FL (ref 80–100)
MCV RBC AUTO: 96 FL (ref 80–100)
MITRAL VALVE E/A RATIO: 1.43
MITRAL VALVE E/E' RATIO: 10.12
MONOCYTES # BLD AUTO: 0.43 X10*3/UL (ref 0.05–0.8)
MONOCYTES NFR BLD AUTO: 7.1 %
MUCOUS THREADS #/AREA URNS AUTO: ABNORMAL /LPF
NEUTROPHILS # BLD AUTO: 2.47 X10*3/UL (ref 1.6–5.5)
NEUTROPHILS NFR BLD AUTO: 40.9 %
NITRITE UR QL STRIP.AUTO: NEGATIVE
NON HDL CHOLESTEROL: 130 MG/DL (ref 0–149)
NRBC BLD-RTO: 0 /100 WBCS (ref 0–0)
NRBC BLD-RTO: 0.3 /100 WBCS (ref 0–0)
OXYHGB MFR BLDA: 98 % (ref 94–98)
PCO2 BLDA: 32 MM HG (ref 38–42)
PEEP CMH2O: 5 CM H2O
PH BLDA: 7.41 PH (ref 7.38–7.42)
PH UR STRIP.AUTO: 5.5 [PH]
PHOSPHATE SERPL-MCNC: 3.2 MG/DL (ref 2.5–4.9)
PHOSPHATE SERPL-MCNC: 3.9 MG/DL (ref 2.5–4.9)
PLATELET # BLD AUTO: 106 X10*3/UL (ref 150–450)
PLATELET # BLD AUTO: 137 X10*3/UL (ref 150–450)
PLATELET # BLD AUTO: 147 X10*3/UL (ref 150–450)
PLATELET # BLD AUTO: 163 X10*3/UL (ref 150–450)
PO2 BLDA: 162 MM HG (ref 85–95)
POTASSIUM BLDA-SCNC: 4.3 MMOL/L (ref 3.5–5.3)
POTASSIUM SERPL-SCNC: 3.7 MMOL/L (ref 3.5–5.3)
POTASSIUM SERPL-SCNC: 4.2 MMOL/L (ref 3.5–5.3)
POTASSIUM SERPL-SCNC: 4.6 MMOL/L (ref 3.5–5.3)
PR INTERVAL: 176 MS
PROT SERPL-MCNC: 5.8 G/DL (ref 6.4–8.2)
PROT SERPL-MCNC: 6.1 G/DL (ref 6.4–8.2)
PROT UR STRIP.AUTO-MCNC: ABNORMAL MG/DL
Q ONSET: 218 MS
Q ONSET: 221 MS
QRS COUNT: 12 BEATS
QRS COUNT: 8 BEATS
QRS DURATION: 118 MS
QRS DURATION: 136 MS
QT INTERVAL: 488 MS
QT INTERVAL: 624 MS
QTC CALCULATION(BAZETT): 530 MS
QTC CALCULATION(BAZETT): 568 MS
QTC FREDERICIA: 516 MS
QTC FREDERICIA: 587 MS
R AXIS: -7 DEGREES
R AXIS: 95 DEGREES
RBC # BLD AUTO: 3.16 X10*6/UL (ref 4–5.2)
RBC # BLD AUTO: 4.01 X10*6/UL (ref 4–5.2)
RBC # BLD AUTO: 4.21 X10*6/UL (ref 4–5.2)
RBC # BLD AUTO: 4.48 X10*6/UL (ref 4–5.2)
RBC # UR STRIP.AUTO: ABNORMAL /UL
RBC #/AREA URNS AUTO: ABNORMAL /HPF
RIGHT VENTRICLE FREE WALL PEAK S': 14.3 CM/S
RIGHT VENTRICLE PEAK SYSTOLIC PRESSURE: 33.9 MMHG
SAO2 % BLDA: 100 % (ref 94–100)
SODIUM BLDA-SCNC: 130 MMOL/L (ref 136–145)
SODIUM SERPL-SCNC: 136 MMOL/L (ref 136–145)
SODIUM SERPL-SCNC: 136 MMOL/L (ref 136–145)
SODIUM SERPL-SCNC: 137 MMOL/L (ref 136–145)
SP GR UR STRIP.AUTO: 1.04
SPECIMEN DRAWN FROM PATIENT: ABNORMAL
T AXIS: -85 DEGREES
T AXIS: 136 DEGREES
T OFFSET: 462 MS
T OFFSET: 533 MS
TIDAL VOLUME: 400 ML
TRICUSPID ANNULAR PLANE SYSTOLIC EXCURSION: 2.3 CM
TRIGL SERPL-MCNC: 64 MG/DL (ref 0–149)
UFH PPP CHRO-ACNC: 0.3 IU/ML
UFH PPP CHRO-ACNC: 0.4 IU/ML
UFH PPP CHRO-ACNC: 1.1 IU/ML
UROBILINOGEN UR STRIP.AUTO-MCNC: NORMAL MG/DL
VENTILATOR MODE: ABNORMAL
VENTILATOR RATE: 18 BPM
VENTRICULAR RATE: 50 BPM
VENTRICULAR RATE: 71 BPM
VLDL: 13 MG/DL (ref 0–40)
WBC # BLD AUTO: 10.4 X10*3/UL (ref 4.4–11.3)
WBC # BLD AUTO: 6 X10*3/UL (ref 4.4–11.3)
WBC # BLD AUTO: 6.4 X10*3/UL (ref 4.4–11.3)
WBC # BLD AUTO: 9.7 X10*3/UL (ref 4.4–11.3)
WBC #/AREA URNS AUTO: ABNORMAL /HPF

## 2024-05-07 PROCEDURE — 93306 TTE W/DOPPLER COMPLETE: CPT | Performed by: INTERNAL MEDICINE

## 2024-05-07 PROCEDURE — 83735 ASSAY OF MAGNESIUM: CPT | Performed by: NURSE PRACTITIONER

## 2024-05-07 PROCEDURE — 2020000001 HC ICU ROOM DAILY

## 2024-05-07 PROCEDURE — 2500000004 HC RX 250 GENERAL PHARMACY W/ HCPCS (ALT 636 FOR OP/ED): Performed by: ANESTHESIOLOGY

## 2024-05-07 PROCEDURE — 2500000001 HC RX 250 WO HCPCS SELF ADMINISTERED DRUGS (ALT 637 FOR MEDICARE OP)

## 2024-05-07 PROCEDURE — 36415 COLL VENOUS BLD VENIPUNCTURE: CPT | Performed by: NURSE PRACTITIONER

## 2024-05-07 PROCEDURE — 81001 URINALYSIS AUTO W/SCOPE: CPT | Performed by: EMERGENCY MEDICINE

## 2024-05-07 PROCEDURE — 2500000005 HC RX 250 GENERAL PHARMACY W/O HCPCS: Performed by: STUDENT IN AN ORGANIZED HEALTH CARE EDUCATION/TRAINING PROGRAM

## 2024-05-07 PROCEDURE — 80061 LIPID PANEL: CPT | Performed by: INTERNAL MEDICINE

## 2024-05-07 PROCEDURE — 94681 O2 UPTK CO2 OUTP % O2 XTRC: CPT

## 2024-05-07 PROCEDURE — 84132 ASSAY OF SERUM POTASSIUM: CPT | Performed by: NURSE PRACTITIONER

## 2024-05-07 PROCEDURE — 80053 COMPREHEN METABOLIC PANEL: CPT | Performed by: NURSE PRACTITIONER

## 2024-05-07 PROCEDURE — 36415 COLL VENOUS BLD VENIPUNCTURE: CPT | Performed by: ANESTHESIOLOGY

## 2024-05-07 PROCEDURE — 85027 COMPLETE CBC AUTOMATED: CPT | Mod: 91 | Performed by: EMERGENCY MEDICINE

## 2024-05-07 PROCEDURE — 93010 ELECTROCARDIOGRAM REPORT: CPT | Performed by: INTERNAL MEDICINE

## 2024-05-07 PROCEDURE — 94003 VENT MGMT INPAT SUBQ DAY: CPT

## 2024-05-07 PROCEDURE — 93005 ELECTROCARDIOGRAM TRACING: CPT

## 2024-05-07 PROCEDURE — 2500000004 HC RX 250 GENERAL PHARMACY W/ HCPCS (ALT 636 FOR OP/ED): Performed by: NURSE PRACTITIONER

## 2024-05-07 PROCEDURE — 83036 HEMOGLOBIN GLYCOSYLATED A1C: CPT | Mod: AHULAB

## 2024-05-07 PROCEDURE — 85520 HEPARIN ASSAY: CPT | Mod: 91,MUE | Performed by: ANESTHESIOLOGY

## 2024-05-07 PROCEDURE — 84100 ASSAY OF PHOSPHORUS: CPT | Performed by: NURSE PRACTITIONER

## 2024-05-07 PROCEDURE — 2500000004 HC RX 250 GENERAL PHARMACY W/ HCPCS (ALT 636 FOR OP/ED): Performed by: EMERGENCY MEDICINE

## 2024-05-07 PROCEDURE — 85027 COMPLETE CBC AUTOMATED: CPT | Performed by: NURSE PRACTITIONER

## 2024-05-07 PROCEDURE — 85027 COMPLETE CBC AUTOMATED: CPT | Mod: 91 | Performed by: NURSE PRACTITIONER

## 2024-05-07 PROCEDURE — 99222 1ST HOSP IP/OBS MODERATE 55: CPT | Performed by: INTERNAL MEDICINE

## 2024-05-07 PROCEDURE — C9113 INJ PANTOPRAZOLE SODIUM, VIA: HCPCS | Performed by: EMERGENCY MEDICINE

## 2024-05-07 PROCEDURE — 93306 TTE W/DOPPLER COMPLETE: CPT

## 2024-05-07 PROCEDURE — 84100 ASSAY OF PHOSPHORUS: CPT | Mod: 91

## 2024-05-07 PROCEDURE — 84484 ASSAY OF TROPONIN QUANT: CPT | Performed by: INTERNAL MEDICINE

## 2024-05-07 PROCEDURE — 82330 ASSAY OF CALCIUM: CPT | Mod: 91 | Performed by: NURSE PRACTITIONER

## 2024-05-07 PROCEDURE — 2500000001 HC RX 250 WO HCPCS SELF ADMINISTERED DRUGS (ALT 637 FOR MEDICARE OP): Performed by: NURSE PRACTITIONER

## 2024-05-07 PROCEDURE — 85520 HEPARIN ASSAY: CPT | Performed by: ANESTHESIOLOGY

## 2024-05-07 PROCEDURE — 2500000004 HC RX 250 GENERAL PHARMACY W/ HCPCS (ALT 636 FOR OP/ED): Mod: JZ

## 2024-05-07 PROCEDURE — 82947 ASSAY GLUCOSE BLOOD QUANT: CPT | Mod: 91

## 2024-05-07 PROCEDURE — 82947 ASSAY GLUCOSE BLOOD QUANT: CPT

## 2024-05-07 PROCEDURE — 99223 1ST HOSP IP/OBS HIGH 75: CPT | Performed by: STUDENT IN AN ORGANIZED HEALTH CARE EDUCATION/TRAINING PROGRAM

## 2024-05-07 PROCEDURE — 83605 ASSAY OF LACTIC ACID: CPT | Mod: 91,MUE | Performed by: NURSE PRACTITIONER

## 2024-05-07 PROCEDURE — 95700 EEG CONT REC W/VID EEG TECH: CPT

## 2024-05-07 PROCEDURE — 95716 VEEG EA 12-26HR CONT MNTR: CPT

## 2024-05-07 PROCEDURE — 83735 ASSAY OF MAGNESIUM: CPT | Mod: 91

## 2024-05-07 PROCEDURE — 95720 EEG PHY/QHP EA INCR W/VEEG: CPT | Performed by: EMERGENCY MEDICINE

## 2024-05-07 PROCEDURE — 99291 CRITICAL CARE FIRST HOUR: CPT | Performed by: EMERGENCY MEDICINE

## 2024-05-07 PROCEDURE — 80053 COMPREHEN METABOLIC PANEL: CPT | Mod: 91,MUE

## 2024-05-07 PROCEDURE — 3E0G76Z INTRODUCTION OF NUTRITIONAL SUBSTANCE INTO UPPER GI, VIA NATURAL OR ARTIFICIAL OPENING: ICD-10-PCS | Performed by: EMERGENCY MEDICINE

## 2024-05-07 PROCEDURE — 36600 WITHDRAWAL OF ARTERIAL BLOOD: CPT

## 2024-05-07 RX ORDER — LEVETIRACETAM 5 MG/ML
500 INJECTION INTRAVASCULAR EVERY 12 HOURS
Status: DISCONTINUED | OUTPATIENT
Start: 2024-05-07 | End: 2024-05-07

## 2024-05-07 RX ORDER — CALCIUM GLUCONATE 20 MG/ML
1 INJECTION, SOLUTION INTRAVENOUS ONCE
Status: DISCONTINUED | OUTPATIENT
Start: 2024-05-07 | End: 2024-05-07 | Stop reason: ALTCHOICE

## 2024-05-07 RX ORDER — PANTOPRAZOLE SODIUM 40 MG/10ML
40 INJECTION, POWDER, LYOPHILIZED, FOR SOLUTION INTRAVENOUS DAILY
Status: DISCONTINUED | OUTPATIENT
Start: 2024-05-07 | End: 2024-05-09

## 2024-05-07 RX ORDER — CALCIUM GLUCONATE 98 MG/ML
1 INJECTION, SOLUTION INTRAVENOUS ONCE
Status: DISCONTINUED | OUTPATIENT
Start: 2024-05-08 | End: 2024-05-07

## 2024-05-07 RX ORDER — LEVETIRACETAM 15 MG/ML
20 INJECTION INTRAVASCULAR ONCE
Status: COMPLETED | OUTPATIENT
Start: 2024-05-07 | End: 2024-05-07

## 2024-05-07 RX ORDER — POLYETHYLENE GLYCOL 3350 17 G/17G
17 POWDER, FOR SOLUTION ORAL DAILY
Status: DISCONTINUED | OUTPATIENT
Start: 2024-05-07 | End: 2024-05-09

## 2024-05-07 RX ORDER — LEVETIRACETAM 15 MG/ML
20 INJECTION INTRAVASCULAR EVERY 12 HOURS
Status: DISCONTINUED | OUTPATIENT
Start: 2024-05-07 | End: 2024-05-07

## 2024-05-07 RX ORDER — FENTANYL CITRATE 50 UG/ML
25 INJECTION, SOLUTION INTRAMUSCULAR; INTRAVENOUS
Status: DISCONTINUED | OUTPATIENT
Start: 2024-05-07 | End: 2024-05-10

## 2024-05-07 RX ORDER — NAPROXEN SODIUM 220 MG/1
81 TABLET, FILM COATED ORAL DAILY
Status: DISCONTINUED | OUTPATIENT
Start: 2024-05-07 | End: 2024-05-09

## 2024-05-07 RX ORDER — CALCIUM GLUCONATE 20 MG/ML
1 INJECTION, SOLUTION INTRAVENOUS ONCE
Status: COMPLETED | OUTPATIENT
Start: 2024-05-08 | End: 2024-05-08

## 2024-05-07 RX ORDER — ATORVASTATIN CALCIUM 80 MG/1
80 TABLET, FILM COATED ORAL NIGHTLY
Status: DISCONTINUED | OUTPATIENT
Start: 2024-05-07 | End: 2024-05-09

## 2024-05-07 RX ADMIN — BUSPIRONE HYDROCHLORIDE 30 MG: 10 TABLET ORAL at 05:55

## 2024-05-07 RX ADMIN — PANTOPRAZOLE SODIUM 40 MG: 40 INJECTION, POWDER, FOR SOLUTION INTRAVENOUS at 10:41

## 2024-05-07 RX ADMIN — Medication 30 PERCENT: at 12:12

## 2024-05-07 RX ADMIN — PROPOFOL 30 MCG/KG/MIN: 10 INJECTION, EMULSION INTRAVENOUS at 18:21

## 2024-05-07 RX ADMIN — Medication 30 PERCENT: at 15:44

## 2024-05-07 RX ADMIN — FENTANYL CITRATE 25 MCG: 0.05 INJECTION, SOLUTION INTRAMUSCULAR; INTRAVENOUS at 01:54

## 2024-05-07 RX ADMIN — Medication 30 PERCENT: at 07:37

## 2024-05-07 RX ADMIN — ACETAMINOPHEN 650 MG: 650 SOLUTION ORAL at 13:35

## 2024-05-07 RX ADMIN — Medication 30 PERCENT: at 08:00

## 2024-05-07 RX ADMIN — CALCIUM GLUCONATE 1 G: 20 INJECTION, SOLUTION INTRAVENOUS at 21:43

## 2024-05-07 RX ADMIN — ACETAMINOPHEN 650 MG: 650 SOLUTION ORAL at 00:41

## 2024-05-07 RX ADMIN — SODIUM CHLORIDE, POTASSIUM CHLORIDE, SODIUM LACTATE AND CALCIUM CHLORIDE 500 ML: 600; 310; 30; 20 INJECTION, SOLUTION INTRAVENOUS at 13:31

## 2024-05-07 RX ADMIN — ACETAMINOPHEN 650 MG: 650 SOLUTION ORAL at 23:46

## 2024-05-07 RX ADMIN — Medication 30 PERCENT: at 19:23

## 2024-05-07 RX ADMIN — BUSPIRONE HYDROCHLORIDE 30 MG: 10 TABLET ORAL at 21:02

## 2024-05-07 RX ADMIN — PROPOFOL 40 MCG/KG/MIN: 10 INJECTION, EMULSION INTRAVENOUS at 11:20

## 2024-05-07 RX ADMIN — BUSPIRONE HYDROCHLORIDE 30 MG: 10 TABLET ORAL at 13:35

## 2024-05-07 RX ADMIN — CALCIUM GLUCONATE 1 G: 20 INJECTION, SOLUTION INTRAVENOUS at 23:46

## 2024-05-07 RX ADMIN — Medication 30 PERCENT: at 20:00

## 2024-05-07 RX ADMIN — LEVETIRACETAM 750 MG: 100 INJECTION, SOLUTION INTRAVENOUS at 21:02

## 2024-05-07 RX ADMIN — POLYETHYLENE GLYCOL 3350 17 G: 17 POWDER, FOR SOLUTION ORAL at 09:00

## 2024-05-07 RX ADMIN — SODIUM CHLORIDE, POTASSIUM CHLORIDE, SODIUM LACTATE AND CALCIUM CHLORIDE 500 ML: 600; 310; 30; 20 INJECTION, SOLUTION INTRAVENOUS at 12:29

## 2024-05-07 RX ADMIN — ACETAMINOPHEN 650 MG: 650 SOLUTION ORAL at 18:23

## 2024-05-07 RX ADMIN — ASPIRIN 81 MG 81 MG: 81 TABLET ORAL at 10:45

## 2024-05-07 RX ADMIN — LEVETIRACETAM 1500 MG: 15 INJECTION INTRAVASCULAR at 09:47

## 2024-05-07 RX ADMIN — ACETAMINOPHEN 650 MG: 650 SOLUTION ORAL at 05:55

## 2024-05-07 RX ADMIN — PROPOFOL 35 MCG/KG/MIN: 10 INJECTION, EMULSION INTRAVENOUS at 00:57

## 2024-05-07 RX ADMIN — PROPOFOL 45 MCG/KG/MIN: 10 INJECTION, EMULSION INTRAVENOUS at 05:55

## 2024-05-07 RX ADMIN — ATORVASTATIN CALCIUM 80 MG: 80 TABLET, FILM COATED ORAL at 21:02

## 2024-05-07 ASSESSMENT — PAIN - FUNCTIONAL ASSESSMENT: PAIN_FUNCTIONAL_ASSESSMENT: CPOT (CRITICAL CARE PAIN OBSERVATION TOOL)

## 2024-05-07 ASSESSMENT — COGNITIVE AND FUNCTIONAL STATUS - GENERAL
TOILETING: TOTAL
PERSONAL GROOMING: TOTAL
HELP NEEDED FOR BATHING: TOTAL
MOBILITY SCORE: 6
DRESSING REGULAR LOWER BODY CLOTHING: TOTAL
EATING MEALS: TOTAL
DRESSING REGULAR LOWER BODY CLOTHING: TOTAL
MOVING FROM LYING ON BACK TO SITTING ON SIDE OF FLAT BED WITH BEDRAILS: TOTAL
TOILETING: TOTAL
PERSONAL GROOMING: TOTAL
DAILY ACTIVITIY SCORE: 6
MOBILITY SCORE: 6
TURNING FROM BACK TO SIDE WHILE IN FLAT BAD: TOTAL
DRESSING REGULAR UPPER BODY CLOTHING: TOTAL
STANDING UP FROM CHAIR USING ARMS: TOTAL
CLIMB 3 TO 5 STEPS WITH RAILING: TOTAL
STANDING UP FROM CHAIR USING ARMS: TOTAL
HELP NEEDED FOR BATHING: TOTAL
EATING MEALS: TOTAL
MOVING FROM LYING ON BACK TO SITTING ON SIDE OF FLAT BED WITH BEDRAILS: TOTAL
WALKING IN HOSPITAL ROOM: TOTAL
WALKING IN HOSPITAL ROOM: TOTAL
DRESSING REGULAR UPPER BODY CLOTHING: TOTAL
TURNING FROM BACK TO SIDE WHILE IN FLAT BAD: TOTAL
MOVING TO AND FROM BED TO CHAIR: TOTAL
DAILY ACTIVITIY SCORE: 6
CLIMB 3 TO 5 STEPS WITH RAILING: TOTAL
MOVING TO AND FROM BED TO CHAIR: TOTAL

## 2024-05-07 ASSESSMENT — ACTIVITIES OF DAILY LIVING (ADL): LACK_OF_TRANSPORTATION: NO

## 2024-05-07 NOTE — PROGRESS NOTES
Care Coordinator Note:  TCC spoke with patients jenny Weinstein at bedside. Demo is correct. Patient lives home alone independently. Denies falls, denies DME. Unsure if patient has PCP. Patient is active with Medicare AB Plan. Copy placed in chart.     Plan: patient in with vfib arrest. Intubated. Neuro following - EEG running, cardio following- hep gtt.  Plan for cardiac cath when more stable. Plan to start TF today. Dietician following.     Status: inpatient  Payor: Medicare A/B  Disposition: TBD. From home alone  Barrier: Intubated  ADOD: TBD    Daylin Lopez [facility]      05/07/24 1029   Discharge Planning   Living Arrangements Alone   Support Systems Children   Assistance Needed IND with ADLs   Type of Residence Private residence   Number of Stairs to Enter Residence 1   Number of Stairs Within Residence 12   Do you have animals or pets at home? No   Who is requesting discharge planning? Provider   Home or Post Acute Services Post acute facilities (Rehab/SNF/etc)   Type of Post Acute Facility Services Skilled nursing   Patient expects to be discharged to: Will need PT OT evals prior to DC. from home alone   Does the patient need discharge transport arranged? Yes   RoundTrip coordination needed? Yes   Has discharge transport been arranged? No   Financial Resource Strain   How hard is it for you to pay for the very basics like food, housing, medical care, and heating? Not hard   Housing Stability   In the last 12 months, was there a time when you were not able to pay the mortgage or rent on time? N   In the last 12 months, was there a time when you did not have a steady place to sleep or slept in a shelter (including now)? N   Transportation Needs   In the past 12 months, has lack of transportation kept you from medical appointments or from getting medications? no   In the past 12 months, has lack of transportation kept you from meetings, work, or from getting things needed for daily living? No

## 2024-05-07 NOTE — CONSULTS
Inpatient consult to Cardiology  Consult performed by: MARILEE Marshall-CNP  Consult ordered by: Kelsie Darling PA-C  Reason for consult: Ventricular Fibrillation Arrest          History Of Present Illness:    Khadra Velazquez is a 74 y.o. 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  Per EMS report, patient was found down next to lawnmower by neighbors, EMS was called, on arrival CPR was initiated and when patient was connected to [facility] patient was found to be in V-fib, she was shocked x 3 at 200/300/360 joules; received 2 rounds of epi, Amio 150 bolus and was intubated before ROSC> which was obtained in the field. Post ROSC rhythm with sinus. EMS arrival to time of ROSC was 13 minutes. In the ED, Cath Lab was activated for diffuse ST depression found on EKG> given patient's neurologic function, any emergent cath lab procedures were deferred until Neurology has evaluated and given recommendations. Cardiology consulted for further evaluation of V-fib arrest, HS trop 185/683.    [place] [facility] [place] is here for further evaluation post cardiac arrest. She is currently intubated with mechanical ventilation; sedation is on hold. Patient is unresponsive and is not responding to stimuli. Her sons are present, POA is Sánchez Velazquez # [phone]. He reports that he spoke to his mother prior to arrest and she has been in her normal state of health with no complaints. She reportedly is compliant with her BP medications and had her follow up with Neurosurgery regard h/o SAH with coil and was doing well. She was working in her yard when she arrested. She has no reported cardiac history.     ED course: Initial EKG: Reported SR with diffuse ST depression; Arrival vital signs: Afebrile 97.0, HR 75, BP 85/69, 100% on ventilator; Current vital signs: /76, HR 54, 95% on ventilator; Pertinent  imaging/Labs: HS trop 185/683, WBC 10.4, Hgb 12.9, , , K4.6, CR 0.96, lactate 4.4, ; CTH reveals no acute intracranial processes; chest x-ray reveals no acute cardiopulmonary processes; ED medications: Heparin bolus/infusion, propofol infusion, Keppra IVPB.    Home meds:  Norvasc 10 mg p.o. daily, lisinopril 20 mg p.o. daily, multivitamin p.o. daily    All other systems reviewed and negative unless as mentioned in HPI.       Past Medical/ Surgical History:  Hypertension  Hyperlipidemia  Aneurysmal SAH s/p coil procedure       Social History:  Denies smoking, Alcohol or illicit drug use per family    Family History:  Reviewed    Allergies:  Patient has no known allergies.    ROS:  10 point review of systems including (Constitutional, Eyes, ENMT, Respiratory, Cardiac, Gastrointestinal, Neurological, Psychiatric, and Hematologic) was performed and is otherwise negative.    Objective Data:  Last Recorded Vitals:  Vitals:    24 0500 24 0600 24 0700 24 0800   BP: 125/66 130/76  (!) 147/133   BP Location:       Patient Position:       Pulse: 51 54  53   Resp: 21   19   Temp: 35.6 °C (96.1 °F) 36 °C (96.8 °F) 35.4 °C (95.7 °F) 36 °C (96.8 °F)   TempSrc: Bladder Bladder Bladder    SpO2: 98% 95%  100%   Weight:  75.6 kg (166 lb 10.7 oz)     Height:         Medical Gas Therapy: Supplemental oxygen  O2 Delivery Method: Endotracheal tube  Weight  Av.1 kg (158 lb 14.6 oz)  Min: 70.3 kg (155 lb)  Max: 75.6 kg (166 lb 10.7 oz)      LABS:  CMP:  Results from last 7 days   Lab Units 24  0608 24  1558   SODIUM mmol/L 136 136   POTASSIUM mmol/L 4.6 3.7   CHLORIDE mmol/L 101 100   CO2 mmol/L 20* 19*   ANION GAP mmol/L 20 21*   BUN mg/dL 21 16   CREATININE mg/dL 0.96 1.03   EGFR mL/min/1.73m*2 63 61   ALBUMIN g/dL 3.9 3.8   ALT U/L  --  78*   AST U/L  --  71*   BILIRUBIN TOTAL mg/dL  --  0.3     CBC:  Results from last 7 days   Lab Units 24  0608 24  4421  "  WBC AUTO x10*3/uL 10.4 6.0   HEMOGLOBIN g/dL 12.9 11.9*   HEMATOCRIT % 40.2 36.8   PLATELETS AUTO x10*3/uL 137* 163   MCV fL 96 92     COAG:     ABO: No results found for: \"ABO\"  HEME/ENDO:     CARDIAC:   Results from last 7 days   Lab Units 05/06/24  1704 05/06/24  1558   TROPHS ng/L 683* 185*   BNP pg/mL  --  509*             Last I/O:    Intake/Output Summary (Last 24 hours) at 5/7/2024 0833  Last data filed at 5/7/2024 0800  Gross per 24 hour   Intake 1037.72 ml   Output 1158 ml   Net -120.28 ml     Net IO Since Admission: -120.28 mL [05/07/24 0833]            Inpatient Medications:  Scheduled medications   Medication Dose Route Frequency    acetaminophen  650 mg nasogastric tube q6h CÉSAR    busPIRone  30 mg nasogastric tube q8h CÉSAR    insulin lispro  0-5 Units subcutaneous q4h    oxygen   inhalation Continuous - Inhalation    polyethylene glycol  17 g oral Daily     PRN medications   Medication    dextrose    dextrose    fentaNYL    glucagon    glucagon    heparin     Continuous Medications   Medication Dose Last Rate    heparin  0-4,000 Units/hr Stopped (05/07/24 0700)    lactated Ringer's  50 mL/hr 50 mL/hr (05/07/24 0800)    propofol  5-50 mcg/kg/min Stopped (05/07/24 0823)       Inpatient Medications:  Scheduled medications   Medication Dose Route Frequency    acetaminophen  650 mg nasogastric tube q6h CÉSAR    busPIRone  30 mg nasogastric tube q8h CÉSAR    insulin lispro  0-5 Units subcutaneous q4h    oxygen   inhalation Continuous - Inhalation    polyethylene glycol  17 g oral Daily     PRN medications   Medication    dextrose    dextrose    fentaNYL    glucagon    glucagon    heparin     Continuous Medications   Medication Dose Last Rate    heparin  0-4,000 Units/hr Stopped (05/07/24 0700)    lactated Ringer's  50 mL/hr 50 mL/hr (05/07/24 0800)    propofol  5-50 mcg/kg/min Stopped (05/07/24 0823)     Outpatient Medications:  No current outpatient medications    Physical Exam:  General:  Patient " unresponsive/ Intubated on ventilatory support  Neck:  No thyromegaly.  Normal Jugular Venous Pressure.  Cardiovascular:  Regular rate and rhythm. No cardiac murmurs noted. Normal S1 and S2.  Pulmonary:  Clear to auscultation bilaterally.  Abdomen:  Soft. Non-tender.   Non-distended.  Positive bowel sounds.  Lower Extremities:  2+ pedal pulses. No LE edema.  Neurologic:  Cranial nerves intact.  No focal deficit.   Skin: Skin warm and dry, normal skin turgor.      Assessment/Plan   Khadra Velazquez is a 74 y.o. 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  Per EMS report, patient was found down next to lawnmower by neighbors, EMS was called, on arrival CPR was initiated and when patient was connected to [facility] patient was found to be in V-fib, she was shocked x 3 at 200/300/360 joules; received 2 rounds of epi, Amio 150 bolus and was intubated before ROSC> which was obtained in the field. Post ROSC rhythm with sinus. EMS arrival to time of ROSC was 13 minutes. In the ED, Cath Lab was activated for diffuse ST depression found on EKG> given patient's neurologic function, any emergent cath lab procedures were deferred until Neurology has evaluated and given recommendations. Cardiology consulted for further evaluation of V-fib arrest, HS trop 185/683.    Home meds:  Norvasc 10 mg p.o. daily, lisinopril 20 mg p.o. daily, multivitamin p.o. daily    I reviewed telemetry; SR with occasional PVC's  EKG reveals SR HR 71 with T wave abnormality and nonspecific intraventricular block    1.  Unwitnessed cardiac arrest/ Vfib Arrest (EMS arrival to time of ROSC was 13 minutes).  Shocked x 3 at 200/300/360 joules; received 2 rounds of epi, Amio 150 bolus and was intubated before ROSC> which was obtained in the field.   Post Arrest Rhythm: SR  Intubated w/Ventilatory Support  Target temperature management     2. Acute MI  (NSTEMI)  Trend Trop until downtrend  C/w Heparin, ASA, statin, hold BB d/t SB  Hold Ace/ ARB d/t soft BP's and pending echo  Labs pend: Lipid Panel, hbgA1c, TSH    3. HTN.  Soft/Acceptable given current circumstances. Monitoring    4.  Hyperlipidemia.  Continue statin      Recommendations as above:  ICU level care/ management  Echo pending  Will plan for coronary angiography pending neuro recovery    Code Status:  Full Code            Nargis Chowdhury, APRN-CNP

## 2024-05-07 NOTE — CONSULTS
Inpatient consult to Neurology  Consult performed by: Arron Hoang MD  Consult ordered by: Kelsie Darling PA-C        History Of Present Illness  Khadra Velazquez is a 74 y.o. female with hx of brain aneurysm s/p coiling presenting with v-fib arrest. Unknown downtime. Neurology consulted for assessment.    Patient was found down by neighbors yesterday. EMS called, found in Vfib arrest. ROSC after 3 rounds of defibrilation in the field. Came to [facility] ED.    CT head was done and showed area of small focal area of encephalomalacia in the right frontal lobe along with presumably RANDA aneurysm coil vs clip creating streak artifacts.     Patient was admitted in ICU. Per bedside RN, patient started having myoclonic jerks last night and was started on propofol. EEG was started.     Currently patient is intubated, propofol was held. EEG shows burst suppression. Soon after propofol pause, patient developed myoclonic jerks of eyelids and face. Upon further holding propofol, myoclonic jerks became more rigorous and spread to L arm and leg. Then eventually full body jerks. Propofol was resumed.          Past Medical History  Past Medical History:   Diagnosis Date    Hypertension      Surgical History  History reviewed. No pertinent surgical history.  Social History  Social History     Tobacco Use    Smoking status: Never    Smokeless tobacco: Never   Vaping Use    Vaping status: Unknown   Substance Use Topics    Alcohol use: Never    Drug use: Never     Allergies  Patient has no known allergies.  No medications prior to admission.       Review of Systems  Neurological Exam  GENERAL APPEARANCE:  Intubated, sedated on propofol -- paused     CARDIOVASCULAR: irregular rhythm on tele      MENTAL STATE:   Pt intubated, eyes open, frequent myoclonic jerks of eyelids and face. Not tracking the examiner. Not following any commands.   Orientation, memory, language, attention, concentration, calculation, and general fund of knowledge was  "impaired.    OPHTHALMOSCOPIC:   Deferred    CRANIAL NERVES:   CN 2   No blinks to threat  CN 3, 4, 6   Pupils round, 3 mm in diameter, sluggish reactive. No VOR  CN 5   Intact corneal reflex checked in between myoclonus  CN 7   Face appears symmetric  CN 8   Unable to assess due to pt's impaired mental status   CN 9,10   Gag/in-line suction reflex appears intact   CN 11   Unable to assess as pt is intubated   CN 12   Unable to assess as pt is intubated    MOTOR:   Muscle bulk and tone were normal in both upper and lower extremities.   Face and truncal myoclonus -- upon holding propofol for a prolonged period, myoclonus spread to L hemibody, eventually whole body.     REFLEXES: hyporeflexic throughout   Babinski: toes mute to plantar stimulation bilaterally.   No clonus.    SENSORY:   No withdrawal to noxious stimuli throughout     COORDINATION:    Unable to assess due to pt's impaired mental status     GAIT:   Unable to assess as pt is intubated     Physical Exam  Last Recorded Vitals  Blood pressure (!) 147/133, pulse 53, temperature 36 °C (96.8 °F), resp. rate 19, height 1.58 m (5' 2.21\"), weight 75.6 kg (166 lb 10.7 oz), SpO2 100%.    Relevant Results        NIH Stroke Scale  1A. Level of Consciousness: Postures or Unresponsive  1B. Ask Month and Age: No Questions Right  1C. Blink Eyes & Squeeze Hands: Performs 0 Tasks  2. Best Gaze: Normal  4. Facial Palsy: Normal Symmetrical Movements  5A. Motor - Left Arm: No Movement  5B. Motor - Right Arm: No Movement  6A. Motor - Left Leg: No Movement  6B. Motor - Right Leg: No Movement  7. Limb Ataxia: Absent  9. Best Language: No Aphasia  10. Dysarthria: Intubated  11. Extinction and Inattention: No Abnormality           [place] Coma Scale  Best Eye Response: None  Best Verbal Response: None  Best Motor Response: Flexion to pain  [place] Coma Scale Score: 5                 I have personally reviewed the following imaging results CT brain attack angio head and neck W and " WO IV contrast    Result Date: 5/6/2024  Interpreted By:  Gino Mccord  and Bryson Saravanan STUDY: CT BRAIN ATTACK ANGIO HEAD AND NECK W AND WO IV CONTRAST   INDICATION: Signs/Symptoms:unresponsive, history of aneurysm s/p clip   COMPARISON: Noncontrast CT of the head performed same day.   ACCESSION NUMBER(S): [id number]   ORDERING CLINICIAN: RALEIGH TSANG   TECHNIQUE: Axial CT images of the head and neck were acquired following administration of 75 mL Omnipaque 350 intravenous contrast. Coronal and sagittal maximum intensity projection images were provided.   FINDINGS: Please refer to separately dictated noncontrast CT of the head performed same day for respective intracranial findings.   CTA head:   Beam hardening artifact secondary to suggested clipping or coiling involving the region of the cavernous or supra segment right internal carotid artery degrades image quality. There is mild vascular calcifications most pronounced within the cavernous and clinoid segments bilaterally. The anterior and middle cerebral arteries are patent bilaterally. The intracranial vertebral arteries, vertebrobasilar junction, basilar artery and posterior cerebral arteries are patent bilaterally.   There is no evidence of aneurysm or vascular malformation.   CTA neck:   Image quality is degraded by beam hardening artifact secondary to intravenous contrast.   The visualized aortic arch and origin of the great vessels patent with scattered calcifications noted.   The common carotid arteries, carotid bifurcations and cervical internal carotid arteries are patent bilaterally. Eccentric calcifications at the carotid bifurcations and proximal cervical internal carotid arteries are without stenosis as measured per NASCET criteria.   The vertebral arteries originate from the subclavian arteries bilaterally are patent throughout cervical course.   And endotracheal tube terminates at the level of the edda. Partially visualized  enteric tube extends below the field of view. The visualized upper lungs demonstrate biapical opacities compatible with pleural-parenchymal scarring. Multilevel degenerative changes of the cervical spine are most pronounced at C4-5, C5-6 and C6-7 where there are disc osteophyte complexes, uncovertebral joint hypertrophy and facet arthropathy.       Status post clipping or coiling involving the region of the supra clinoid more likely than cavernous segment right internal carotid artery somewhat degrades image quality. Within this limitation, there is no flow-limiting stenosis or occlusion identified within head or neck. MRI would be more sensitive and specific if recent ischemia were clinically suspected.   Please refer to separately dictated noncontrast CT of the head performed same day for respective intracranial findings.   I personally reviewed the images/study and I agree with the findings as stated by Dr. Massey.   MACRO: Saravanan Massey discussed the significance and urgency of this critical finding by telephone with  RALEIGH TSANG on 5/6/2024 at 5:20 pm.  (**-RCF-**) Findings:  See findings.   Signed by: Gino Mccord 5/6/2024 5:29 PM Dictation workstation:   [id number]    CT brain attack head wo IV contrast    Result Date: 5/6/2024  Interpreted By:  Netta Armenta, STUDY: CT BRAIN ATTACK HEAD WO IV CONTRAST;  5/6/2024 4:49 pm   INDICATION: Unresponsiveness. Altered mental status.   COMPARISON: None.   ACCESSION NUMBER(S): [id number]   ORDERING CLINICIAN: RALEIGH TSANG   TECHNIQUE: Noncontrast axial CT scan of head was performed. Angled reformats in brain and bone windows were generated. The images were reviewed in bone, brain, blood and soft tissue windows.   FINDINGS: CSF Spaces: The ventricles, sulci and basal cisterns are slightly prominent. There is no extraaxial fluid collection.   Parenchyma: There are surgical clips seen along the right side of the onbsiq-ye-Kxopet. A  focal area of encephalomalacia with transcortical extension is seen within the right frontal lobe. There is an old lacunar infarct of the right basal ganglia identified. No mass effect, midline shift, or intracranial hemorrhage is seen. No hyperdense MCA sign is visible. There is some diminished density seen within the white matter of each cerebral hemisphere indicating chronic microvascular ischemic disease.   Calvarium: The calvarium is unremarkable.   Paranasal sinuses and mastoids: Visualized paranasal sinuses and mastoids are clear.       Mild atrophy and chronic microvascular ischemic disease.   Old lacunar infarct right basal ganglia.   Focal encephalomalacia within the right frontal lobe.   Surgical clips along the right side of the [id number].   No acute intracranial process is seen.   MACRO: Netta Armenta discussed the significance and urgency of this critical finding by telephone with  RALEIGH TSANG on 5/6/2024 at 5:00 pm.  (**-RCF-**) Findings:  See findings.       Signed by: Netta Armenta 5/6/2024 5:01 PM Dictation workstation:   [id number]    XR chest 1 view    Result Date: 5/6/2024  STUDY: Chest Radiograph;  05/06/2024 4:13 PM INDICATION: Cardiac arrest. COMPARISON: None Available ACCESSION NUMBER(S): [id number] ORDERING CLINICIAN: RALEIGH TSANG TECHNIQUE:  Frontal chest (four images) was obtained at 16:12 hours. FINDINGS: CARDIOMEDIASTINAL SILHOUETTE: The endotracheal tube is noted in place.  The tip is about 4.2 cm above the level of the edda.  Enteric tube traverses the mediastinum and extends into the stomach, the distal end is not included on the present study. Cardiomediastinal silhouette is normal in size.  There is mild elongation and calcification of the thoracic aorta.  LUNGS: Lungs are clear.  There is no consolidation, pleural effusion, or pneumothorax.  ABDOMEN: No remarkable upper abdominal findings.  BONES: No acute osseous changes.    1.Normal heart  size with no radiographic signs of active pulmonary parenchymal infiltration. 2.Endotracheal tube and enteric tube in place. Signed by Briana Mason DO  .      Assessment/Plan   Principal Problem:    Cardiac arrest (Multi)    Khadra Velazquez is a 74 y.o. female with hx of brain aneurysm s/p coiling presenting with v-fib arrest. Unknown downtime. ROSC after 3 rounds of defibrilation in the field, now admitted in MICU. Patient is showing early myoclonus with burst suppression on EEG.    Diagnosis:  Post anoxic myoclonic status epilepticus     Recs:   - start keppra, 20mg/kg load then 750 BID (~10mg/kg/day) to assist with weaning off of sedation  - continue to monitor on EEG   - MRI post arrest day 3 to help assist further anoxic inury      Patient seen, evaluated and discussed with attending physician, Arron Carey PGY5  Vascular Neurology Fellow

## 2024-05-07 NOTE — PROGRESS NOTES
"Khadra Velazquez is a 74 y.o. female on day 1 of admission presenting with Cardiac arrest (Multi).    Subjective   Patient seen in ED yesterday shortly prior to end of my shift.  Subsequently she was admitted to ICU overnight and started on TTM. Per cardiology recommendations, she started on heparin infusion overnight in setting of VF arrest, elevated troponin, and EKG with diffuse ST depression.           Objective     Physical Exam  General: Intubated female, lying in bed  HEENT: Normocephalic, no obvious overt external signs of acute trauma. Mucus membranes moist, nares patent  Neck: Trachea midline. No apparent gross C spine deformities  Neurologic: Exam performed with sedation held. Eyes open spontaneously. Frequent myoclonic jerks. Difficult to assess corneal/gal due to frequent myoclonus.  Pupils approximately 3 mm OU and reactive. +Cough.  No clear response to noxious stimuli in BUE and BLE, however, appears to be flexor posturing in BUE with frequent myoclonus.   Cardiovascular: RRR, pulses grossly symmetric  Respiratory: Intubated. Bilateral breath sounds, equal chest rise  Abdomen: Soft, not appreciably tender or distended, no evident rebound/guarding  MSK: No apparent gross bony deformities in BUE and BLE  Skin/Integumentary: Cool and dry (Arctic sun in place for TTM)    Last Recorded Vitals  Blood pressure 130/76, pulse 54, temperature 36 °C (96.8 °F), temperature source Bladder, resp. rate 20, height 1.58 m (5' 2.21\"), weight 75.6 kg (166 lb 10.7 oz), SpO2 95%.  Intake/Output last 3 Shifts:  No intake/output data recorded.    Relevant Results           This patient currently has cardiac telemetry ordered; if you would like to modify or discontinue the telemetry order, click here to go to the orders activity to modify/discontinue the order.    Scheduled medications  acetaminophen, 650 mg, nasogastric tube, q6h CÉSAR  busPIRone, 30 mg, nasogastric tube, q8h CÉSAR  insulin lispro, 0-5 Units, subcutaneous, " q4h  oxygen, , inhalation, Continuous - Inhalation  polyethylene glycol, 17 g, oral, Daily      Continuous medications  heparin, 0-4,000 Units/hr, Last Rate: 700 Units/hr (05/07/24 0300)  lactated Ringer's, 50 mL/hr, Last Rate: 50 mL/hr (05/07/24 0300)  propofol, 5-50 mcg/kg/min, Last Rate: 45 mcg/kg/min (05/07/24 0600)      PRN medications  PRN medications: dextrose, dextrose, fentaNYL, glucagon, glucagon, heparin  Results for orders placed or performed during the hospital encounter of 05/06/24 (from the past 24 hour(s))   POCT GLUCOSE   Result Value Ref Range    POCT Glucose 198 (H) 74 - 99 mg/dL   BLOOD GAS VENOUS FULL PANEL   Result Value Ref Range    POCT pH, Venous 7.13 (LL) 7.33 - 7.43 pH    POCT pCO2, Venous 55 (H) 41 - 51 mm Hg    POCT pO2, Venous 53 (H) 35 - 45 mm Hg    POCT SO2, Venous 75 45 - 75 %    POCT Oxy Hemoglobin, Venous 73.5 45.0 - 75.0 %    POCT Hematocrit Calculated, Venous 37.0 36.0 - 46.0 %    POCT Sodium, Venous 132 (L) 136 - 145 mmol/L    POCT Potassium, Venous 3.8 3.5 - 5.3 mmol/L    POCT Chloride, Venous 98 98 - 107 mmol/L    POCT Ionized Calicum, Venous 1.15 1.10 - 1.33 mmol/L    POCT Glucose, Venous 183 (H) 74 - 99 mg/dL    POCT Lactate, Venous 8.2 (HH) 0.4 - 2.0 mmol/L    POCT Base Excess, Venous -11.0 (L) -2.0 - 3.0 mmol/L    POCT HCO3 Calculated, Venous 18.3 (L) 22.0 - 26.0 mmol/L    POCT Hemoglobin, Venous 12.3 12.0 - 16.0 g/dL    POCT Anion Gap, Venous 20.0 10.0 - 25.0 mmol/L    Patient Temperature 37.0 degrees Celsius    FiO2 100 %   CBC with Differential   Result Value Ref Range    WBC 6.0 4.4 - 11.3 x10*3/uL    nRBC 0.3 (H) 0.0 - 0.0 /100 WBCs    RBC 4.01 4.00 - 5.20 x10*6/uL    Hemoglobin 11.9 (L) 12.0 - 16.0 g/dL    Hematocrit 36.8 36.0 - 46.0 %    MCV 92 80 - 100 fL    MCH 29.7 26.0 - 34.0 pg    MCHC 32.3 32.0 - 36.0 g/dL    RDW 12.6 11.5 - 14.5 %    Platelets 163 150 - 450 x10*3/uL    Neutrophils % 40.9 40.0 - 80.0 %    Immature Granulocytes %, Automated 1.7 (H) 0.0 - 0.9 %     Lymphocytes % 48.7 13.0 - 44.0 %    Monocytes % 7.1 2.0 - 10.0 %    Eosinophils % 1.3 0.0 - 6.0 %    Basophils % 0.3 0.0 - 2.0 %    Neutrophils Absolute 2.47 1.20 - 7.70 x10*3/uL    Immature Granulocytes Absolute, Automated 0.10 0.00 - 0.70 x10*3/uL    Lymphocytes Absolute 2.94 1.20 - 4.80 x10*3/uL    Monocytes Absolute 0.43 0.10 - 1.00 x10*3/uL    Eosinophils Absolute 0.08 0.00 - 0.70 x10*3/uL    Basophils Absolute 0.02 0.00 - 0.10 x10*3/uL   Comprehensive Metabolic Panel   Result Value Ref Range    Glucose 192 (H) 74 - 99 mg/dL    Sodium 136 136 - 145 mmol/L    Potassium 3.7 3.5 - 5.3 mmol/L    Chloride 100 98 - 107 mmol/L    Bicarbonate 19 (L) 21 - 32 mmol/L    Anion Gap 21 (H) 10 - 20 mmol/L    Urea Nitrogen 16 6 - 23 mg/dL    Creatinine 1.03 0.50 - 1.05 mg/dL    eGFR 61 >60 mL/min/1.73m*2    Calcium 8.3 (L) 8.6 - 10.3 mg/dL    Albumin 3.8 3.4 - 5.0 g/dL    Alkaline Phosphatase 61 33 - 136 U/L    Total Protein 5.8 (L) 6.4 - 8.2 g/dL    AST 71 (H) 9 - 39 U/L    Bilirubin, Total 0.3 0.0 - 1.2 mg/dL    ALT 78 (H) 7 - 45 U/L   Brain Natriuretic Peptide   Result Value Ref Range     (H) 0 - 99 pg/mL   Troponin I, High Sensitivity, Initial   Result Value Ref Range    Troponin I, High Sensitivity 185 (HH) 0 - 13 ng/L   Blood Gas Lactic Acid, Venous   Result Value Ref Range    POCT Lactate, Venous 4.4 (HH) 0.4 - 2.0 mmol/L   Troponin I, High Sensitivity   Result Value Ref Range    Troponin I, High Sensitivity 683 (HH) 0 - 13 ng/L   POCT GLUCOSE   Result Value Ref Range    POCT Glucose 107 (H) 74 - 99 mg/dL   POCT GLUCOSE   Result Value Ref Range    POCT Glucose 91 74 - 99 mg/dL   Heparin Assay, UFH   Result Value Ref Range    Heparin Unfractionated 1.1 (HH) See Comment Below for Therapeutic Ranges IU/mL   POCT GLUCOSE   Result Value Ref Range    POCT Glucose 103 (H) 74 - 99 mg/dL   Blood Gas Arterial Full Panel   Result Value Ref Range    POCT pH, Arterial 7.41 7.38 - 7.42 pH    POCT pCO2, Arterial 32 (L) 38 -  42 mm Hg    POCT pO2, Arterial 162 (H) 85 - 95 mm Hg    POCT SO2, Arterial 100 94 - 100 %    POCT Oxy Hemoglobin, Arterial 98.0 94.0 - 98.0 %    POCT Hematocrit Calculated, Arterial 38.0 36.0 - 46.0 %    POCT Sodium, Arterial 130 (L) 136 - 145 mmol/L    POCT Potassium, Arterial 4.3 3.5 - 5.3 mmol/L    POCT Chloride, Arterial 102 98 - 107 mmol/L    POCT Ionized Calcium, Arterial 1.08 (L) 1.10 - 1.33 mmol/L    POCT Glucose, Arterial 136 (H) 74 - 99 mg/dL    POCT Lactate, Arterial 2.0 0.4 - 2.0 mmol/L    POCT Base Excess, Arterial -3.5 (L) -2.0 - 3.0 mmol/L    POCT HCO3 Calculated, Arterial 20.3 (L) 22.0 - 26.0 mmol/L    POCT Hemoglobin, Arterial 12.6 12.0 - 16.0 g/dL    POCT Anion Gap, Arterial 12 10 - 25 mmo/L    Patient Temperature 37.0 degrees Celsius    FiO2 40 %    Ventilator Mode A/C     Ventilator Rate 18 bpm    Tidal Volume 400 mL    Peep CHM2O 5.0 cm H2O    Site of Arterial Puncture Radial Left     Walt's Test Positive      CT brain attack angio head and neck W and WO IV contrast    Result Date: 5/6/2024  Interpreted By:  Gino Mccord and Muddasani Saravanan STUDY: CT BRAIN ATTACK ANGIO HEAD AND NECK W AND WO IV CONTRAST   INDICATION: Signs/Symptoms:unresponsive, history of aneurysm s/p clip   COMPARISON: Noncontrast CT of the head performed same day.   ACCESSION NUMBER(S): [id number]   ORDERING CLINICIAN: RALEIGH TSANG   TECHNIQUE: Axial CT images of the head and neck were acquired following administration of 75 mL Omnipaque 350 intravenous contrast. Coronal and sagittal maximum intensity projection images were provided.   FINDINGS: Please refer to separately dictated noncontrast CT of the head performed same day for respective intracranial findings.   CTA head:   Beam hardening artifact secondary to suggested clipping or coiling involving the region of the cavernous or supra segment right internal carotid artery degrades image quality. There is mild vascular calcifications most pronounced  within the cavernous and clinoid segments bilaterally. The anterior and middle cerebral arteries are patent bilaterally. The intracranial vertebral arteries, vertebrobasilar junction, basilar artery and posterior cerebral arteries are patent bilaterally.   There is no evidence of aneurysm or vascular malformation.   CTA neck:   Image quality is degraded by beam hardening artifact secondary to intravenous contrast.   The visualized aortic arch and origin of the great vessels patent with scattered calcifications noted.   The common carotid arteries, carotid bifurcations and cervical internal carotid arteries are patent bilaterally. Eccentric calcifications at the carotid bifurcations and proximal cervical internal carotid arteries are without stenosis as measured per NASCET criteria.   The vertebral arteries originate from the subclavian arteries bilaterally are patent throughout cervical course.   And endotracheal tube terminates at the level of the edda. Partially visualized enteric tube extends below the field of view. The visualized upper lungs demonstrate biapical opacities compatible with pleural-parenchymal scarring. Multilevel degenerative changes of the cervical spine are most pronounced at C4-5, C5-6 and C6-7 where there are disc osteophyte complexes, uncovertebral joint hypertrophy and facet arthropathy.       Status post clipping or coiling involving the region of the supra clinoid more likely than cavernous segment right internal carotid artery somewhat degrades image quality. Within this limitation, there is no flow-limiting stenosis or occlusion identified within head or neck. MRI would be more sensitive and specific if recent ischemia were clinically suspected.   Please refer to separately dictated noncontrast CT of the head performed same day for respective intracranial findings.   I personally reviewed the images/study and I agree with the findings as stated by Dr. Massey.   MACRO: Saravanan  Bryson discussed the significance and urgency of this critical finding by telephone with  RALEIGH TSANG on 5/6/2024 at 5:20 pm.  (**-RCF-**) Findings:  See findings.   Signed by: Gino Mccord 5/6/2024 5:29 PM Dictation workstation:   [id number]    CT brain attack head wo IV contrast    Result Date: 5/6/2024  Interpreted By:  Netta Armenta, STUDY: CT BRAIN ATTACK HEAD WO IV CONTRAST;  5/6/2024 4:49 pm   INDICATION: Unresponsiveness. Altered mental status.   COMPARISON: None.   ACCESSION NUMBER(S): [id number]   ORDERING CLINICIAN: RALEIGH TSANG   TECHNIQUE: Noncontrast axial CT scan of head was performed. Angled reformats in brain and bone windows were generated. The images were reviewed in bone, brain, blood and soft tissue windows.   FINDINGS: CSF Spaces: The ventricles, sulci and basal cisterns are slightly prominent. There is no extraaxial fluid collection.   Parenchyma: There are surgical clips seen along the right side of the cqgwbl-[id number]-Lvstab. A focal area of encephalomalacia with transcortical extension is seen within the right frontal lobe. There is an old lacunar infarct of the right basal ganglia identified. No mass effect, midline shift, or intracranial hemorrhage is seen. No hyperdense MCA sign is visible. There is some diminished density seen within the white matter of each cerebral hemisphere indicating chronic microvascular ischemic disease.   Calvarium: The calvarium is unremarkable.   Paranasal sinuses and mastoids: Visualized paranasal sinuses and mastoids are clear.       Mild atrophy and chronic microvascular ischemic disease.   Old lacunar infarct right basal ganglia.   Focal encephalomalacia within the right frontal lobe.   Surgical clips along the right side of the [id number]-rv-Kqrboy.   No acute intracranial process is seen.   MACRO: Netta Armenta discussed the significance and urgency of this critical finding by telephone with  RALEIGH TSANG on 5/6/2024 at  5:00 pm.  (**-RCF-**) Findings:  See findings.       Signed by: Netta Armenta 5/6/2024 5:01 PM Dictation workstation:   [id number]    XR chest 1 view    Result Date: 5/6/2024  STUDY: Chest Radiograph;  05/06/2024 4:13 PM INDICATION: Cardiac arrest. COMPARISON: None Available ACCESSION NUMBER(S): [id number] ORDERING CLINICIAN: RALEIGH TSANG TECHNIQUE:  Frontal chest (four images) was obtained at 16:12 hours. FINDINGS: CARDIOMEDIASTINAL SILHOUETTE: The endotracheal tube is noted in place.  The tip is about 4.2 cm above the level of the edda.  Enteric tube traverses the mediastinum and extends into the stomach, the distal end is not included on the present study. Cardiomediastinal silhouette is normal in size.  There is mild elongation and calcification of the thoracic aorta.  LUNGS: Lungs are clear.  There is no consolidation, pleural effusion, or pneumothorax.  ABDOMEN: No remarkable upper abdominal findings.  BONES: No acute osseous changes.    1.Normal heart size with no radiographic signs of active pulmonary parenchymal infiltration. 2.Endotracheal tube and enteric tube in place. 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 The patient was reportedly found unresponsive by a neighbor next to her  after being down for an unknown time. No reported bystander CPR.  EMS was called and she was pulseless, with initial rhythm VF. She was administered 2 rounds of epinephrine, 1 dose of amiodarone, and 3 attempts at defibrillation, the last of which was at 360 J. She was intubated in the field. She was taken to the [facility] ED where EKG demonstrated  diffuse ST depressions, most notably inferolaterally.  The cath lab was  activated, however, per interventional cardiology, due to poor neurological exam, she was not taken for intervention at that time. She was subsequently noted to be flexor posturing. CTH with surgical coiling in place, old lacunar R basal ganglia infarct, RF encephalomalacia, but no LVO or acute process. She was ultimately started on heparin gtt after review of imaging by NSGY given history of aneurysm, and per cardiology recommendations.  She was started on TTM and subsequently admitted to the ICU.     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 Read pending, however, concerning possibly for post-anoxic myoclonic seizures.   -Will start with 20 mg/kg load of LEV, followed by 750 mg BID per discussion with neurology  -Neuroprotective measures including but not limited to avoidance of hyperthermia, hypotension; correction of metabolic anomalies as able   -Continue TTM per protocol with goal 36C for now   -Wean sedation as able for neurological assessments    CV  #VF arrest  #History of HTN    PLAN:  -Cath lab activated on arrival in ED given VF arrest and EKG findings as noted above - not a candidate at present given poor neurological status per interventional cardiology - appreciate input-  -General cardiology consulted - started on heparin gtt per recommendations given elevated troponin, concern for LM disease/significant CAD - appreciate input  -TTE pending  -ASA, high intensity statin, heparin per cardiology recommendations  -Continuous telemetry and NIBP monitoring   -Hold home antihypertensives for now    Pulmonary  #Hypoxemic respiratory failure in setting of cardiopulmonary arrest      PLAN:  -Intubated in field prior to arrival  -VAP bundle  -Wean ventilator settings as able    Renal    PLAN:  -Trend strict I/O  -Follow RFP/metabolic panel  -Replete electrolytes as indicated    GI    PLAN:  -PPI for GI ppx  -Start Tfs    Endocrine     PLAN:  -Follow metabolic panel  -POCT glucose/SSI as indicated    Hematologic    PLAN:  -Trend CBC  -Heparin gtt as above  -SCDs    ID    PLAN:  -Trend temperature/WBC curve    ICU checklist:  Diet: NPO - consider starting TF today  GI Prophylaxis: PPI  DVT Prophylaxis: On therapeutic heparin gtt  Code Status: FULL per discussion with adult sons, Sánchez and Andrey at bedside this AM.   Dispo: Maintain in ICU         I spent 61 minutes in the professional and overall care of this patient.      Po Cantor MD

## 2024-05-07 NOTE — SIGNIFICANT EVENT
"Preliminary EEG :   This prelim EEG showed myoclonic status most likely post anoxia and severe diffuse encephalopathy.     There are burst of polyspikes every 2-3 that correlate with head jerk and likely face clonic (video is not clear).      This report is until 8:30 am     Final report will be given tomorrow.Please call with questions. To discontinue EEG there is an order in epic\"Discontinue vEEG\"    Thank you       Kristopher Paris MD   Epilepsy Fellow     "

## 2024-05-07 NOTE — CARE PLAN
The patient's goals for the shift include      The clinical goals for the shift include Maintain HDS    Over the shift, the patient did not make progress toward the following goals. Barriers to progression include acuity of illness. Recommendations to address these barriers include continue plan of care.

## 2024-05-08 ENCOUNTER — APPOINTMENT (OUTPATIENT)
Dept: CARDIOLOGY | Facility: HOSPITAL | Age: 74
DRG: 283 | End: 2024-05-08
Payer: MEDICARE

## 2024-05-08 PROBLEM — R29.735: Status: ACTIVE | Noted: 2024-05-08

## 2024-05-08 PROBLEM — R40.20: Status: ACTIVE | Noted: 2024-05-08

## 2024-05-08 PROBLEM — G25.3 POST HYPOXIC MYOCLONUS: Status: ACTIVE | Noted: 2024-05-08

## 2024-05-08 LAB
ALBUMIN SERPL BCP-MCNC: 2.1 G/DL (ref 3.4–5)
ALBUMIN SERPL BCP-MCNC: 3.1 G/DL (ref 3.4–5)
ALBUMIN SERPL BCP-MCNC: 3.3 G/DL (ref 3.4–5)
ALP SERPL-CCNC: 24 U/L (ref 33–136)
ALP SERPL-CCNC: 45 U/L (ref 33–136)
ALT SERPL W P-5'-P-CCNC: 109 U/L (ref 7–45)
ALT SERPL W P-5'-P-CCNC: 75 U/L (ref 7–45)
ANION GAP SERPL CALC-SCNC: 11 MMOL/L (ref 10–20)
ANION GAP SERPL CALC-SCNC: 11 MMOL/L (ref 10–20)
ANION GAP SERPL CALC-SCNC: 18 MMOL/L (ref 10–20)
AST SERPL W P-5'-P-CCNC: 116 U/L (ref 9–39)
AST SERPL W P-5'-P-CCNC: 142 U/L (ref 9–39)
BILIRUB SERPL-MCNC: 0.3 MG/DL (ref 0–1.2)
BILIRUB SERPL-MCNC: 0.4 MG/DL (ref 0–1.2)
BUN SERPL-MCNC: 11 MG/DL (ref 6–23)
BUN SERPL-MCNC: 16 MG/DL (ref 6–23)
BUN SERPL-MCNC: 16 MG/DL (ref 6–23)
CA-I BLD-SCNC: 1.2 MMOL/L (ref 1.1–1.33)
CALCIUM SERPL-MCNC: 6.5 MG/DL (ref 8.6–10.3)
CALCIUM SERPL-MCNC: 8.1 MG/DL (ref 8.6–10.3)
CALCIUM SERPL-MCNC: 8.7 MG/DL (ref 8.6–10.3)
CARDIAC TROPONIN I PNL SERPL HS: 4099 NG/L (ref 0–13)
CHLORIDE SERPL-SCNC: 101 MMOL/L (ref 98–107)
CHLORIDE SERPL-SCNC: 94 MMOL/L (ref 98–107)
CHLORIDE SERPL-SCNC: 99 MMOL/L (ref 98–107)
CO2 SERPL-SCNC: 14 MMOL/L (ref 21–32)
CO2 SERPL-SCNC: 24 MMOL/L (ref 21–32)
CO2 SERPL-SCNC: 25 MMOL/L (ref 21–32)
CREAT SERPL-MCNC: 0.44 MG/DL (ref 0.5–1.05)
CREAT SERPL-MCNC: 0.82 MG/DL (ref 0.5–1.05)
CREAT SERPL-MCNC: 0.84 MG/DL (ref 0.5–1.05)
EGFRCR SERPLBLD CKD-EPI 2021: 73 ML/MIN/1.73M*2
EGFRCR SERPLBLD CKD-EPI 2021: 76 ML/MIN/1.73M*2
EGFRCR SERPLBLD CKD-EPI 2021: >90 ML/MIN/1.73M*2
ERYTHROCYTE [DISTWIDTH] IN BLOOD BY AUTOMATED COUNT: 13 % (ref 11.5–14.5)
ERYTHROCYTE [DISTWIDTH] IN BLOOD BY AUTOMATED COUNT: 13 % (ref 11.5–14.5)
GLUCOSE BLD MANUAL STRIP-MCNC: 103 MG/DL (ref 74–99)
GLUCOSE BLD MANUAL STRIP-MCNC: 122 MG/DL (ref 74–99)
GLUCOSE BLD MANUAL STRIP-MCNC: 126 MG/DL (ref 74–99)
GLUCOSE BLD MANUAL STRIP-MCNC: 73 MG/DL (ref 74–99)
GLUCOSE BLD MANUAL STRIP-MCNC: 92 MG/DL (ref 74–99)
GLUCOSE BLD MANUAL STRIP-MCNC: 95 MG/DL (ref 74–99)
GLUCOSE BLD MANUAL STRIP-MCNC: 97 MG/DL (ref 74–99)
GLUCOSE BLD MANUAL STRIP-MCNC: 99 MG/DL (ref 74–99)
GLUCOSE SERPL-MCNC: 105 MG/DL (ref 74–99)
GLUCOSE SERPL-MCNC: 113 MG/DL (ref 74–99)
GLUCOSE SERPL-MCNC: 79 MG/DL (ref 74–99)
HCT VFR BLD AUTO: 34.4 % (ref 36–46)
HCT VFR BLD AUTO: 37 % (ref 36–46)
HGB BLD-MCNC: 11.5 G/DL (ref 12–16)
HGB BLD-MCNC: 12.1 G/DL (ref 12–16)
LACTATE SERPL-SCNC: 1.4 MMOL/L (ref 0.4–2)
LACTATE SERPL-SCNC: 6.8 MMOL/L (ref 0.4–2)
MAGNESIUM SERPL-MCNC: 1.3 MG/DL (ref 1.6–2.4)
MAGNESIUM SERPL-MCNC: 1.8 MG/DL (ref 1.6–2.4)
MCH RBC QN AUTO: 29.5 PG (ref 26–34)
MCH RBC QN AUTO: 30.4 PG (ref 26–34)
MCHC RBC AUTO-ENTMCNC: 32.7 G/DL (ref 32–36)
MCHC RBC AUTO-ENTMCNC: 33.4 G/DL (ref 32–36)
MCV RBC AUTO: 90 FL (ref 80–100)
MCV RBC AUTO: 91 FL (ref 80–100)
NRBC BLD-RTO: 0 /100 WBCS (ref 0–0)
NRBC BLD-RTO: 0 /100 WBCS (ref 0–0)
PHOSPHATE SERPL-MCNC: 2.2 MG/DL (ref 2.5–4.9)
PHOSPHATE SERPL-MCNC: 3.2 MG/DL (ref 2.5–4.9)
PHOSPHATE SERPL-MCNC: 3.4 MG/DL (ref 2.5–4.9)
PLATELET # BLD AUTO: 137 X10*3/UL (ref 150–450)
PLATELET # BLD AUTO: 138 X10*3/UL (ref 150–450)
POTASSIUM SERPL-SCNC: 4 MMOL/L (ref 3.5–5.3)
POTASSIUM SERPL-SCNC: 4.1 MMOL/L (ref 3.5–5.3)
POTASSIUM SERPL-SCNC: 4.1 MMOL/L (ref 3.5–5.3)
PROT SERPL-MCNC: 3.5 G/DL (ref 6.4–8.2)
PROT SERPL-MCNC: 5.3 G/DL (ref 6.4–8.2)
RBC # BLD AUTO: 3.78 X10*6/UL (ref 4–5.2)
RBC # BLD AUTO: 4.1 X10*6/UL (ref 4–5.2)
SODIUM SERPL-SCNC: 122 MMOL/L (ref 136–145)
SODIUM SERPL-SCNC: 131 MMOL/L (ref 136–145)
SODIUM SERPL-SCNC: 132 MMOL/L (ref 136–145)
UFH PPP CHRO-ACNC: 0.2 IU/ML
UFH PPP CHRO-ACNC: 0.2 IU/ML
UFH PPP CHRO-ACNC: 0.4 IU/ML
UFH PPP CHRO-ACNC: 0.9 IU/ML
WBC # BLD AUTO: 7.9 X10*3/UL (ref 4.4–11.3)
WBC # BLD AUTO: 8.2 X10*3/UL (ref 4.4–11.3)

## 2024-05-08 PROCEDURE — 99233 SBSQ HOSP IP/OBS HIGH 50: CPT | Performed by: NURSE PRACTITIONER

## 2024-05-08 PROCEDURE — 99233 SBSQ HOSP IP/OBS HIGH 50: CPT | Performed by: STUDENT IN AN ORGANIZED HEALTH CARE EDUCATION/TRAINING PROGRAM

## 2024-05-08 PROCEDURE — 2500000001 HC RX 250 WO HCPCS SELF ADMINISTERED DRUGS (ALT 637 FOR MEDICARE OP)

## 2024-05-08 PROCEDURE — 2500000004 HC RX 250 GENERAL PHARMACY W/ HCPCS (ALT 636 FOR OP/ED): Performed by: EMERGENCY MEDICINE

## 2024-05-08 PROCEDURE — 2500000005 HC RX 250 GENERAL PHARMACY W/O HCPCS: Performed by: EMERGENCY MEDICINE

## 2024-05-08 PROCEDURE — 2500000004 HC RX 250 GENERAL PHARMACY W/ HCPCS (ALT 636 FOR OP/ED): Performed by: NURSE PRACTITIONER

## 2024-05-08 PROCEDURE — 99291 CRITICAL CARE FIRST HOUR: CPT | Performed by: EMERGENCY MEDICINE

## 2024-05-08 PROCEDURE — 82947 ASSAY GLUCOSE BLOOD QUANT: CPT

## 2024-05-08 PROCEDURE — 84100 ASSAY OF PHOSPHORUS: CPT | Performed by: NURSE PRACTITIONER

## 2024-05-08 PROCEDURE — 85027 COMPLETE CBC AUTOMATED: CPT | Mod: 91 | Performed by: EMERGENCY MEDICINE

## 2024-05-08 PROCEDURE — 2500000005 HC RX 250 GENERAL PHARMACY W/O HCPCS: Performed by: STUDENT IN AN ORGANIZED HEALTH CARE EDUCATION/TRAINING PROGRAM

## 2024-05-08 PROCEDURE — 36415 COLL VENOUS BLD VENIPUNCTURE: CPT | Performed by: NURSE PRACTITIONER

## 2024-05-08 PROCEDURE — 2500000001 HC RX 250 WO HCPCS SELF ADMINISTERED DRUGS (ALT 637 FOR MEDICARE OP): Performed by: NURSE PRACTITIONER

## 2024-05-08 PROCEDURE — 85027 COMPLETE CBC AUTOMATED: CPT

## 2024-05-08 PROCEDURE — C9113 INJ PANTOPRAZOLE SODIUM, VIA: HCPCS | Performed by: EMERGENCY MEDICINE

## 2024-05-08 PROCEDURE — 2500000004 HC RX 250 GENERAL PHARMACY W/ HCPCS (ALT 636 FOR OP/ED): Mod: JZ

## 2024-05-08 PROCEDURE — 80069 RENAL FUNCTION PANEL: CPT | Mod: CCI | Performed by: EMERGENCY MEDICINE

## 2024-05-08 PROCEDURE — 83735 ASSAY OF MAGNESIUM: CPT | Performed by: EMERGENCY MEDICINE

## 2024-05-08 PROCEDURE — 83605 ASSAY OF LACTIC ACID: CPT | Performed by: NURSE PRACTITIONER

## 2024-05-08 PROCEDURE — 2020000001 HC ICU ROOM DAILY

## 2024-05-08 PROCEDURE — 85520 HEPARIN ASSAY: CPT | Performed by: ANESTHESIOLOGY

## 2024-05-08 PROCEDURE — 84484 ASSAY OF TROPONIN QUANT: CPT

## 2024-05-08 PROCEDURE — 2500000004 HC RX 250 GENERAL PHARMACY W/ HCPCS (ALT 636 FOR OP/ED): Performed by: ANESTHESIOLOGY

## 2024-05-08 PROCEDURE — 94003 VENT MGMT INPAT SUBQ DAY: CPT

## 2024-05-08 PROCEDURE — 82330 ASSAY OF CALCIUM: CPT | Performed by: NURSE PRACTITIONER

## 2024-05-08 PROCEDURE — 93005 ELECTROCARDIOGRAM TRACING: CPT

## 2024-05-08 PROCEDURE — 93010 ELECTROCARDIOGRAM REPORT: CPT | Performed by: INTERNAL MEDICINE

## 2024-05-08 RX ORDER — LEVETIRACETAM 15 MG/ML
20 INJECTION INTRAVASCULAR EVERY 12 HOURS
Status: DISCONTINUED | OUTPATIENT
Start: 2024-05-08 | End: 2024-05-08

## 2024-05-08 RX ORDER — LEVETIRACETAM 15 MG/ML
20 INJECTION INTRAVASCULAR ONCE
Status: COMPLETED | OUTPATIENT
Start: 2024-05-08 | End: 2024-05-08

## 2024-05-08 RX ADMIN — PROPOFOL 60 MCG/KG/MIN: 10 INJECTION, EMULSION INTRAVENOUS at 21:40

## 2024-05-08 RX ADMIN — LEVETIRACETAM 1500 MG: 15 INJECTION INTRAVASCULAR at 11:43

## 2024-05-08 RX ADMIN — PROPOFOL 50 MCG/KG/MIN: 10 INJECTION, EMULSION INTRAVENOUS at 18:04

## 2024-05-08 RX ADMIN — BUSPIRONE HYDROCHLORIDE 30 MG: 10 TABLET ORAL at 05:16

## 2024-05-08 RX ADMIN — Medication 30 PERCENT: at 08:00

## 2024-05-08 RX ADMIN — Medication 30 PERCENT: at 19:57

## 2024-05-08 RX ADMIN — LEVETIRACETAM 2000 MG: 100 INJECTION, SOLUTION INTRAVENOUS at 20:54

## 2024-05-08 RX ADMIN — Medication 30 PERCENT: at 03:28

## 2024-05-08 RX ADMIN — ACETAMINOPHEN 650 MG: 650 SOLUTION ORAL at 11:47

## 2024-05-08 RX ADMIN — LEVETIRACETAM 750 MG: 100 INJECTION, SOLUTION INTRAVENOUS at 08:48

## 2024-05-08 RX ADMIN — POLYETHYLENE GLYCOL 3350 17 G: 17 POWDER, FOR SOLUTION ORAL at 08:48

## 2024-05-08 RX ADMIN — ASPIRIN 81 MG 81 MG: 81 TABLET ORAL at 08:48

## 2024-05-08 RX ADMIN — ACETAMINOPHEN 650 MG: 650 SOLUTION ORAL at 05:16

## 2024-05-08 RX ADMIN — Medication 30 PERCENT: at 20:15

## 2024-05-08 RX ADMIN — BUSPIRONE HYDROCHLORIDE 30 MG: 10 TABLET ORAL at 13:18

## 2024-05-08 RX ADMIN — Medication 30 PERCENT: at 11:40

## 2024-05-08 RX ADMIN — PROPOFOL 50 MCG/KG/MIN: 10 INJECTION, EMULSION INTRAVENOUS at 13:17

## 2024-05-08 RX ADMIN — ACETAMINOPHEN 650 MG: 650 SOLUTION ORAL at 18:04

## 2024-05-08 RX ADMIN — HEPARIN SODIUM 700 UNITS/HR: 10000 INJECTION, SOLUTION INTRAVENOUS at 02:11

## 2024-05-08 RX ADMIN — PANTOPRAZOLE SODIUM 40 MG: 40 INJECTION, POWDER, FOR SOLUTION INTRAVENOUS at 08:48

## 2024-05-08 RX ADMIN — PROPOFOL 40 MCG/KG/MIN: 10 INJECTION, EMULSION INTRAVENOUS at 02:00

## 2024-05-08 RX ADMIN — ATORVASTATIN CALCIUM 80 MG: 80 TABLET, FILM COATED ORAL at 20:13

## 2024-05-08 ASSESSMENT — COGNITIVE AND FUNCTIONAL STATUS - GENERAL
DRESSING REGULAR UPPER BODY CLOTHING: TOTAL
TOILETING: TOTAL
MOVING TO AND FROM BED TO CHAIR: TOTAL
EATING MEALS: TOTAL
WALKING IN HOSPITAL ROOM: TOTAL
STANDING UP FROM CHAIR USING ARMS: TOTAL
HELP NEEDED FOR BATHING: TOTAL
CLIMB 3 TO 5 STEPS WITH RAILING: TOTAL
MOBILITY SCORE: 6
TURNING FROM BACK TO SIDE WHILE IN FLAT BAD: TOTAL
DRESSING REGULAR LOWER BODY CLOTHING: TOTAL
MOVING FROM LYING ON BACK TO SITTING ON SIDE OF FLAT BED WITH BEDRAILS: TOTAL
DAILY ACTIVITIY SCORE: 6
PERSONAL GROOMING: TOTAL

## 2024-05-08 ASSESSMENT — PAIN - FUNCTIONAL ASSESSMENT
PAIN_FUNCTIONAL_ASSESSMENT: CPOT (CRITICAL CARE PAIN OBSERVATION TOOL)

## 2024-05-08 NOTE — PROGRESS NOTES
05/08/24 1637   Discharge Planning   Patient expects to be discharged to: TBD         Patient remains in ICU. She is still intubated an sedated. On continues EEG, Heparin drip and propofol. Cardio is following and plan is for coronary angiography when ok with neuro. Neuro is following and plan is MRI of  brain third day post arrest. Unable to determine discharge plan. Will follow for discharge needs.

## 2024-05-08 NOTE — PROGRESS NOTES
"Khadra Velazquez is a 74 y.o. female on day 2 of admission presenting with Cardiac arrest (Multi).      Subjective   Continued myoclonic status epilepticus, remains on propofol at rate 35.  Requested another 20mg/kg Keppra load.   Poor candidate for vimpat given recent Vfib arret and poor candidate for VPA given elevated LFTs, repeat pending. BP has been low-normal, poor candidate for phosphenytoin.     Patient examined by bedside. Clinically calm on propofol 35. EEG remains to show active status epilepticus.          Objective     Last Recorded Vitals  Blood pressure 118/59, pulse 55, temperature 36.3 °C (97.3 °F), temperature source Bladder, resp. rate 20, height 1.575 m (5' 2\"), weight 75.3 kg (166 lb), SpO2 90%.    Physical Exam  Neurological Exam  Intubated, sedated on propofol  Pupils 1mm symmetric, poor VOR  Flaccid x4 on nox stimulation     Relevant Results        NIH Stroke Scale  1A. Level of Consciousness: Postures or Unresponsive  1B. Ask Month and Age: No Questions Right  1C. Blink Eyes & Squeeze Hands: Performs 0 Tasks  2. Best Gaze: Normal  4. Facial Palsy: Normal Symmetrical Movements  5A. Motor - Left Arm: No Movement  5B. Motor - Right Arm: No Movement  6A. Motor - Left Leg: No Movement  6B. Motor - Right Leg: No Movement  7. Limb Ataxia: Absent  9. Best Language: No Aphasia  10. Dysarthria: Intubated  11. Extinction and Inattention: No Abnormality           Golden Coma Scale  Best Eye Response: None  Best Verbal Response: None  Best Motor Response: None  Golden Coma Scale Score: 3                             Assessment/Plan   This patient currently has cardiac telemetry ordered; if you would like to modify or discontinue the telemetry order, click here to go to the orders activity to modify/discontinue the order.  Principal Problem:    Cardiac arrest (Multi)  Active Problems:    Coma (Multi)    Nihss score 35    Post hypoxic myoclonus    Khadra Velazquez is a 74 y.o. female with hx of brain aneurysm " s/p coiling presenting with v-fib arrest. Unknown downtime. ROSC after 3 rounds of defibrilation in the field, now admitted in MICU. Patient is showing early myoclonus with burst suppression on EEG. Myoclonus remains active despite starting keppra. Will increase dose today.      Diagnosis:  Post anoxic myoclonic status epilepticus      Recs:   - repeat load keppra, 20mg/kg again today and increase to 1250 BID, if continued status can repeat dose again tonight and increase maintenance to 2g BID (rapid uptitration)   - continue to monitor on EEG, may need to discontinue for MRI and track myoclonic status clinically   - MRI post arrest day 3 to help assist further anoxic inury         Patient seen, evaluated and discussed with attending physician, Arron Carey PGY5  Vascular Neurology Fellow

## 2024-05-08 NOTE — PROGRESS NOTES
"Subjective Data:  Completed TTM protocol and rewarmed overnight   EEG showed concerns for myoclonus status- treated with Keppra      Telemetry review showed SB HR 40s  On propofol and heparin drip.              Objective Data:  Last Recorded Vitals:  Vitals:    05/08/24 0900 05/08/24 1000 05/08/24 1100 05/08/24 1140   BP: 104/52 118/59     Pulse: 61 55     Resp: 19 20  18   Temp: 37.1 °C (98.8 °F) 37 °C (98.6 °F) 36.3 °C (97.3 °F)    TempSrc: Bladder Bladder Bladder    SpO2:  90%  100%   Weight:       Height:           Last Labs:  LABS:  CMP:  Results from last 7 days   Lab Units 05/08/24  1013 05/08/24  0535 05/07/24  2350 05/07/24  1038 05/07/24  0608 05/06/24  1558   SODIUM mmol/L 131* 132* 122* 137 136 136   POTASSIUM mmol/L 4.0 4.1 4.1 4.2 4.6 3.7   CHLORIDE mmol/L 99 101 94* 101 101 100   CO2 mmol/L 25 24 14* 23 20* 19*   ANION GAP mmol/L 11 11 18 17 20 21*   BUN mg/dL 16 16 11 19 21 16   CREATININE mg/dL 0.82 0.84 0.44* 0.83 0.96 1.03   EGFR mL/min/1.73m*2 76 73 >90 75 63 58*   MAGNESIUM mg/dL 1.80  --  1.30* 2.00  --   --    ALBUMIN g/dL 3.1* 3.3* 2.1* 3.8 3.9 3.8   ALT U/L 109*  --  75* 146*  --  78*   AST U/L 142*  --  116* 188*  --  71*   BILIRUBIN TOTAL mg/dL 0.3  --  0.4 0.6  --  0.3     CBC:  Results from last 7 days   Lab Units 05/08/24  1013 05/08/24  0535 05/07/24  2111 05/07/24  1038 05/07/24  0608 05/06/24  1558   WBC AUTO x10*3/uL 7.9 8.2 6.4 9.7 10.4 6.0   HEMOGLOBIN g/dL 11.5* 12.1 10.1* 13.4 12.9 11.9*   HEMATOCRIT % 34.4* 37.0 28.6* 41.2 40.2 36.8   PLATELETS AUTO x10*3/uL 137* 138* 106* 147* 137* 163   MCV fL 91 90 91 92 96 92     COAG:     ABO: No results found for: \"ABO\"  HEME/ENDO:  Results from last 7 days   Lab Units 05/07/24  1041   HEMOGLOBIN A1C % 5.3      CARDIAC:   Results from last 7 days   Lab Units 05/08/24  0535 05/07/24  1038 05/06/24  1704 05/06/24  1558   TROPHS ng/L 4,099* 4,287* 683* 185*   BNP pg/mL  --   --   --  509*     Recent Labs     05/07/24  0608   CHOL 194 "   LDLCALC 117*   HDL 64.3   TRIG 64      Imagine results  Transthoracic Echo (TTE) Complete   Final Result      CT brain attack angio head and neck W and WO IV contrast   Final Result   Status post clipping or coiling involving the region of the supra   clinoid more likely than cavernous segment right internal carotid   artery somewhat degrades image quality. Within this limitation, there   is no flow-limiting stenosis or occlusion identified within head or   neck. MRI would be more sensitive and specific if recent ischemia   were clinically suspected.        Please refer to separately dictated noncontrast CT of the head   performed same day for respective intracranial findings.        I personally reviewed the images/study and I agree with the findings   as stated by Dr. Massey.        MACRO:   Saravanan Massey discussed the significance and urgency of this   critical finding by telephone with  RALEIGH TSANG on   5/6/2024 at 5:20 pm.  (**-RCF-**) Findings:  See findings.        Signed by: Gino Mccord 5/6/2024 5:29 PM   Dictation workstation:   [id number]      CT brain attack head wo IV contrast   Final Result   Mild atrophy and chronic microvascular ischemic disease.        Old lacunar infarct right basal ganglia.        Focal encephalomalacia within the right frontal lobe.        Surgical clips along the right side of the [id number].        No acute intracranial process is seen.        MACRO:   Netta Armenta discussed the significance and urgency of this critical   finding by telephone with  RALEIGH TSANG on 5/6/2024 at   5:00 pm.  (**-RCF-**) Findings:  See findings.                  Signed by: Netta Armenta 5/6/2024 5:01 PM   Dictation workstation:   [id number]      XR chest 1 view   Final Result   1.Normal heart size with no radiographic signs of active   pulmonary parenchymal infiltration.   2.Endotracheal tube and enteric tube in place.   Signed by Briana Mason DO             Last I/O:  I/O last 3 completed shifts:  In: 4987.9 (66.2 mL/kg) [I.V.:2138.5 (28.4 mL/kg); NG/GT:700; IV Piggyback:2149.4]  Out: 1963 (26.1 mL/kg) [Urine:1183 (0.4 mL/kg/hr); Emesis/NG output:780]  Weight: 75.3 kg     Past Cardiology Tests (Last 3 Years):  EKG:  ECG 12 lead 05/07/2024      ECG 12 lead 05/06/2024    Echo:  Transthoracic Echo (TTE) Complete 05/07/2024   1. Left ventricular systolic function is normal with a 55-60% estimated ejection fraction.   2. Spectral Doppler shows an abnormal pattern of left ventricular diastolic filling.   3. The left atrium is severely dilated.   4. The right atrium is moderately dilated.   5. Slightly elevated RVSP.    Cath:  No results found for this or any previous visit from the past 1095 days.    Stress Test:  No results found for this or any previous visit from the past 1095 days.    Cardiac Imaging:  No results found for this or any previous visit from the past 1095 days.      Inpatient Medications:  Scheduled medications   Medication Dose Route Frequency    acetaminophen  650 mg nasogastric tube q6h CÉSAR    aspirin  81 mg oral Daily    atorvastatin  80 mg oral Nightly    busPIRone  30 mg nasogastric tube q8h CÉSAR    insulin lispro  0-5 Units subcutaneous q4h    levETIRAcetam  1,250 mg intravenous q12h    oxygen   inhalation Continuous - Inhalation    pantoprazole  40 mg intravenous Daily    perflutren lipid microspheres  0.5-10 mL of dilution intravenous Once in imaging    perflutren protein A microsphere  0.5 mL intravenous Once in imaging    polyethylene glycol  17 g oral Daily    sulfur hexafluoride microsphr  2 mL intravenous Once in imaging     PRN medications   Medication    dextrose    dextrose    fentaNYL    glucagon    glucagon    heparin     Continuous Medications   Medication Dose Last Rate    heparin  0-4,000 Units/hr 900 Units/hr (05/08/24 1128)    propofol  5-50 mcg/kg/min 35 mcg/kg/min (05/08/24 0827)       Physical Exam:  General:  Patient  unresponsive/ Intubated on ventilatory support  Cardiovascular:  Regular rate and rhythm. No cardiac murmurs noted. Normal S1 and S2.  Pulmonary:  Clear to auscultation bilaterally.  Abdomen:  Soft. Non-tender.   Non-distended.  Positive bowel sounds.  Lower Extremities:  2+ pedal pulses. No LE edema.  Neurologic:  Cranial nerves intact.  No focal deficit.   Skin: Skin warm and dry, normal skin turgor.         Assessment/Plan   Khadra Velazquez is a 74 y.o. 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  Per EMS report, patient was found down next to lawnmower by neighbors, EMS was called, on arrival CPR was initiated and when patient was connected to [facility] patient was found to be in V-fib, she was shocked x 3 at 200/300/360 joules; received 2 rounds of epi, Amio 150 bolus and was intubated before ROSC> which was obtained in the field. Post ROSC rhythm with sinus. EMS arrival to time of ROSC was 13 minutes. In the ED, Cath Lab was activated for diffuse ST depression found on EKG> given patient's neurologic function, any emergent cath lab procedures were deferred until Neurology has evaluated and given recommendations. Cardiology consulted for further evaluation of V-fib arrest, HS trop 185/683.       1.  Unwitnessed cardiac arrest/ Vfib Arrest (EMS arrival to time of ROSC was 13 minutes).  Shocked x 3 at 200/300/360 joules; received 2 rounds of epi, Amio 150 bolus and was intubated before ROSC> which was obtained in the field.   Post Arrest Rhythm: SR  Intubated w/Ventilatory Support  Concerns for possible anoxic brain injury. Neurology recommendations reviewed. Myoclonus noted with after sedation paused, very poor prognostic sign recovery.        2. Acute MI (NSTEMI)  Troponin peaked at 4287 Echo showed preserved LV systolic function LVEF 55-60%.   C/w Heparin for 48 hours, ASA, statin, hold BB d/t SB  Hold Ace/  ARB d/t soft BP's        3. HTN.  Soft/Acceptable given current circumstances. Monitoring     4.  Hyperlipidemia.  Continue statin        Recommendations as above:  Continue supportive care  and medical management for ACS   Plan for coronary angiography pending neuro recovery  Stop heparin drip tonight 5/8/2024 at 1915 (48           Code Status:  Full Code      Harpreet Long, APRN-CNP

## 2024-05-08 NOTE — CONSULTS
"Nutrition Assessment Note  Nutrition Assessment      Reason for Assessment  Reason for Assessment: Provider consult order, Tube feeding assessment and order, Tube feeding recommendations    History:  Food and Nutrient History  Food and Nutrient History: NPO       Dietary Orders (From admission, onward)       Start     Ordered    05/08/24 1138  Enteral feeding with NPO 10 (advance by 10 ml every 8 hours as tolerated to goal rate 30 ml/hr); 30; Water; Tap water; Every 6 hours  Diet effective now        Question Answer Comment   Tube feeding formula: Vital 1.5    Tube feeding continuous rate (mL/hr): 10 advance by 10 ml every 8 hours as tolerated to goal rate 30 ml/hr   Tube feeding flush (mL): 30    Flush type: Water    Water type: Tap water    Flush frequency: Every 6 hours        05/08/24 1137                      Anthropometrics:  Height: 165.1 cm (5' 5\")  Weight: 75.3 kg (166 lb 0.1 oz)  BMI (Calculated): 27.62      IBW/kg (Dietitian Calculated): 56.8 kg  Percent of IBW: 133 %    Wt hx: 66.77 kg, 165.1 cm 8/15/23         Wt Readings from Last 15 Encounters:   05/08/24 75.3 kg (166 lb 0.1 oz)     Scheduled medications  acetaminophen, 650 mg, nasogastric tube, q6h CÉSAR  aspirin, 81 mg, oral, Daily  atorvastatin, 80 mg, oral, Nightly  insulin lispro, 0-5 Units, subcutaneous, q4h  levETIRAcetam, 1,250 mg, intravenous, q12h  oxygen, , inhalation, Continuous - Inhalation  pantoprazole, 40 mg, intravenous, Daily  perflutren lipid microspheres, 0.5-10 mL of dilution, intravenous, Once in imaging  perflutren protein A microsphere, 0.5 mL, intravenous, Once in imaging  polyethylene glycol, 17 g, oral, Daily  sulfur hexafluoride microsphr, 2 mL, intravenous, Once in imaging      Continuous medications  heparin, 0-4,000 Units/hr, Last Rate: 900 Units/hr (05/08/24 1128)  propofol, 5-50 mcg/kg/min, Last Rate: 50 mcg/kg/min (05/08/24 1317)      PRN medications  PRN medications: dextrose, dextrose, fentaNYL, glucagon, glucagon, " "heparin     Latest Reference Range & Units 05/08/24 10:13   GLUCOSE 74 - 99 mg/dL 105 (H)   SODIUM 136 - 145 mmol/L 131 (L)   POTASSIUM 3.5 - 5.3 mmol/L 4.0   CHLORIDE 98 - 107 mmol/L 99   Bicarbonate 21 - 32 mmol/L 25   Anion Gap 10 - 20 mmol/L 11   Blood Urea Nitrogen 6 - 23 mg/dL 16   Creatinine 0.50 - 1.05 mg/dL 0.82   EGFR >60 mL/min/1.73m*2 76   Calcium 8.6 - 10.3 mg/dL 8.1 (L)   PHOSPHORUS 2.5 - 4.9 mg/dL 3.2   Albumin 3.4 - 5.0 g/dL 3.1 (L)   Alkaline Phosphatase 33 - 136 U/L 45   ALT 7 - 45 U/L 109 (H)   AST 9 - 39 U/L 142 (H)   Bilirubin Total 0.0 - 1.2 mg/dL 0.3   Total Protein 6.4 - 8.2 g/dL 5.3 (L)   MAGNESIUM 1.60 - 2.40 mg/dL 1.80   Lactate 0.4 - 2.0 mmol/L 1.4   (H): Data is abnormally high  (L): Data is abnormally low      Energy Needs:  Calculated Energy Needs Using Equations  Height: 165.1 cm (5' 5\")  Weight Used for Equation Calculations: 75.3 kg (166 lb 0.1 oz)  [facility] Equation (Critically Ill Patients): 1414  Minute Ventilation (L/min): 6.18 L/min  [facility] Equation (Overweight or Obese Patients): 1259  Equation Chosen to Use by RD: [facility]  Activity Factor: 1.2  Stress Factor: 1  Total Energy Needs: 1700  Temp: 36.6 °C (97.9 °F)         Estimated Protein Needs  Method for Estimating Needs: 55-75 g/d (1.0-1.3 g/kg IBW)    Estimated Fluid Needs  Method for Estimating Needs: per MD         Nutrition Focused Physical Findings:  Subcutaneous Fat Loss  Orbital Fat Pads: Mild-Moderate (slight dark circles and slight hollowing) (per visualization only, + EEG)    Muscle Wasting  Temporalis: Mild-Moderate (slight depression)    Edema  Edema: none         Physical Findings (Nutrition Deficiency/Toxicity)  Skin: Negative       Nutrition Diagnosis   Malnutrition Diagnosis  Patient has Malnutrition Diagnosis: No    Patient has Nutrition Diagnosis: Yes  Nutrition Diagnosis 1: Inadequate oral intake  Diagnosis Status (1): New  Related to (1): intubation  As Evidenced by (1): NPO  Additional " Assessment Information (1): propofol @ 21.6 ml/hr provides 570 kcal/day           Nutrition Interventions/Recommendations   Nutrition Prescription  Individualized Nutrition Prescription Provided for : Vital 1.5 @ 30 ml/hr provides 1080 kcla/d, 49 g/d protein, 550 ml free water; water flush per MD    Food and/or Nutrient Delivery Interventions    Enteral Intake: Other (Comment)  Goal: TF tolerance, maintain  intact GI fx                     Additional Interventions: nutrition plan of care to parallel overall plan of care         Coordination of Nutrition Care by a Nutrition Professional  Collaboration and Referral of Nutrition Care: Collaboration by nutrition professional with other providers    Education Documentation  No documentation found.        Nutrition Monitoring and Evaluation   Food and Nutrient Related History      Enteral and Parenteral Nutrition Intake: Enteral nutrition formula/solution, Enteral nutrition intake  Criteria: monitor TF tolerance closely    Anthropometrics: Body Composition/Growth/Weight History  Weight: Weight change  Criteria: monitor trend      Biochemical Data, Medical Tests and Procedures  Electrolyte and Renal Panel: Other (Comment), BUN, Phosphorus, Potassium, Sodium, Calcium, serum, Chloride, Creatinine, Magnesium  Criteria: monitor as indicated    Gastrointestinal Profile: Other (Comment), Alanine aminotransferase (ALT), Aspartate aminotransferase (AST), Bilirubin, total  Criteria: monitor as indicated    Glucose/Endocrine Profile: Other (Comment), Glucose, casual  Criteria: monitor as indicated    Nutritional Anemia Profile: Other (Comment), Hematocrit, Hemoglobin, Iron, serum  Criteria: monitor as indicated    Vitamin Profile: Other (Comment), Vitamin D, 25 hydroxy  Criteria: as indicated    Nutrition Focused Physical Findings   - monitor                                   Follow Up  Time Spent (min): 60 minutes  Last Date of Nutrition Visit: 05/08/24  Nutrition Follow-Up  Needed?: Dietitian to reassess per policy  Follow up Comment: TF, intubated, TIN

## 2024-05-08 NOTE — PROGRESS NOTES
"Khadra Velazquez is a 74 y.o. female on day 2 of admission presenting with Cardiac arrest (Multi).    Subjective   Rewarmed overnight. Otherwise, no acute interval events overnight reported.        Objective     Physical Exam  General: Intubated female, lying in bed   HEENT: NCAT, MMM  Neck: Trachea midline.  Neurologic: Exam performed with sedation held. Frequent myoclonic jerks. Difficult to assess corneal/gal due to frequent myoclonus and stimulus-induced myoclonus.  Downward left gaze with sedation held.  Pupils approximately 3 mm OU and reactive. +Cough.  No clear response to noxious stimuli in BUE and BLE, but does have worsening stimulus-induced myoclonus.   Cardiovascular: RRR, pulses grossly symmetric  Respiratory: Intubated. Bilateral breath sounds, equal chest rise  Abdomen: Soft, not appreciably tender or distended, no evident rebound/guarding  MSK: No apparent gross bony deformities in BUE and BLE  Skin/Integumentary: Warm and dry    Last Recorded Vitals  Blood pressure (!) 134/104, pulse 58, temperature 37.3 °C (99.1 °F), temperature source Bladder, resp. rate 22, height 1.575 m (5' 2\"), weight 75.3 kg (166 lb), SpO2 99%.  Intake/Output last 3 Shifts:  I/O last 3 completed shifts:  In: 4987.9 (66.2 mL/kg) [I.V.:2138.5 (28.4 mL/kg); NG/GT:700; IV Piggyback:2149.4]  Out: 1963 (26.1 mL/kg) [Urine:1183 (0.4 mL/kg/hr); Emesis/NG output:780]  Weight: 75.3 kg     Relevant Results           This patient currently has cardiac telemetry ordered; if you would like to modify or discontinue the telemetry order, click here to go to the orders activity to modify/discontinue the order.    Scheduled medications  acetaminophen, 650 mg, nasogastric tube, q6h CÉSAR  aspirin, 81 mg, oral, Daily  atorvastatin, 80 mg, oral, Nightly  busPIRone, 30 mg, nasogastric tube, q8h CÉSAR  insulin lispro, 0-5 Units, subcutaneous, q4h  levETIRAcetam, 750 mg, intravenous, q12h  oxygen, , inhalation, Continuous - Inhalation  pantoprazole, 40 mg, " intravenous, Daily  perflutren lipid microspheres, 0.5-10 mL of dilution, intravenous, Once in imaging  perflutren protein A microsphere, 0.5 mL, intravenous, Once in imaging  polyethylene glycol, 17 g, oral, Daily  sulfur hexafluoride microsphr, 2 mL, intravenous, Once in imaging      Continuous medications  heparin, 0-4,000 Units/hr, Last Rate: 700 Units/hr (05/08/24 0556)  lactated Ringer's, 50 mL/hr, Last Rate: 50 mL/hr (05/07/24 2200)  propofol, 5-50 mcg/kg/min, Last Rate: 25 mcg/kg/min (05/08/24 0743)      PRN medications  PRN medications: dextrose, dextrose, fentaNYL, glucagon, glucagon, heparin  Results for orders placed or performed during the hospital encounter of 05/06/24 (from the past 24 hour(s))   ECG 12 lead   Result Value Ref Range    Ventricular Rate 50 BPM    Atrial Rate 48 BPM    QRS Duration 118 ms    QT Interval 624 ms    QTC Calculation(Bazett) 568 ms    R Axis -7 degrees    T Axis 136 degrees    QRS Count 8 beats    Q Onset 221 ms    T Offset 533 ms    QTC Fredericia 587 ms   Comprehensive metabolic panel   Result Value Ref Range    Glucose 119 (H) 74 - 99 mg/dL    Sodium 137 136 - 145 mmol/L    Potassium 4.2 3.5 - 5.3 mmol/L    Chloride 101 98 - 107 mmol/L    Bicarbonate 23 21 - 32 mmol/L    Anion Gap 17 10 - 20 mmol/L    Urea Nitrogen 19 6 - 23 mg/dL    Creatinine 0.83 0.50 - 1.05 mg/dL    eGFR 75 >60 mL/min/1.73m*2    Calcium 8.3 (L) 8.6 - 10.3 mg/dL    Albumin 3.8 3.4 - 5.0 g/dL    Alkaline Phosphatase 54 33 - 136 U/L    Total Protein 6.1 (L) 6.4 - 8.2 g/dL     (H) 9 - 39 U/L    Bilirubin, Total 0.6 0.0 - 1.2 mg/dL     (H) 7 - 45 U/L   CBC   Result Value Ref Range    WBC 9.7 4.4 - 11.3 x10*3/uL    nRBC 0.0 0.0 - 0.0 /100 WBCs    RBC 4.48 4.00 - 5.20 x10*6/uL    Hemoglobin 13.4 12.0 - 16.0 g/dL    Hematocrit 41.2 36.0 - 46.0 %    MCV 92 80 - 100 fL    MCH 29.9 26.0 - 34.0 pg    MCHC 32.5 32.0 - 36.0 g/dL    RDW 12.9 11.5 - 14.5 %    Platelets 147 (L) 150 - 450 x10*3/uL    Calcium, Ionized   Result Value Ref Range    POCT Calcium, Ionized 0.96 (L) 1.1 - 1.33 mmol/L   Magnesium   Result Value Ref Range    Magnesium 2.00 1.60 - 2.40 mg/dL   Phosphorus   Result Value Ref Range    Phosphorus 3.2 2.5 - 4.9 mg/dL   Lactate   Result Value Ref Range    Lactate 4.1 (HH) 0.4 - 2.0 mmol/L   SST TOP   Result Value Ref Range    Extra Tube Hold for add-ons.    Troponin I, High Sensitivity   Result Value Ref Range    Troponin I, High Sensitivity 4,287 (HH) 0 - 13 ng/L   Hemoglobin A1c   Result Value Ref Range    Hemoglobin A1C 5.3 see below %    Estimated Average Glucose 105 Not Established mg/dL   Heparin Assay, UFH   Result Value Ref Range    Heparin Unfractionated 0.3 See Comment Below for Therapeutic Ranges IU/mL   POCT GLUCOSE   Result Value Ref Range    POCT Glucose 104 (H) 74 - 99 mg/dL   Urinalysis with Reflex Culture and Microscopic   Result Value Ref Range    Color, Urine Yellow Light-Yellow, Yellow, Dark-Yellow    Appearance, Urine Clear Clear    Specific Gravity, Urine 1.040 (N) 1.005 - 1.035    pH, Urine 5.5 5.0, 5.5, 6.0, 6.5, 7.0, 7.5, 8.0    Protein, Urine 30 (1+) (A) NEGATIVE, 10 (TRACE), 20 (TRACE) mg/dL    Glucose, Urine Normal Normal mg/dL    Blood, Urine 0.03 (TRACE) (A) NEGATIVE    Ketones, Urine NEGATIVE NEGATIVE mg/dL    Bilirubin, Urine NEGATIVE NEGATIVE    Urobilinogen, Urine Normal Normal mg/dL    Nitrite, Urine NEGATIVE NEGATIVE    Leukocyte Esterase, Urine NEGATIVE NEGATIVE   Urinalysis Microscopic   Result Value Ref Range    WBC, Urine 1-5 1-5, NONE /HPF    RBC, Urine 11-20 (A) NONE, 1-2, 3-5 /HPF    Mucus, Urine FEW Reference range not established. /LPF   Transthoracic Echo (TTE) Complete   Result Value Ref Range    AV pk gopal 1.45 m/s    AV mn grad 4.0 mmHg    LVOT diam 2.00 cm    LV Biplane EF 60 %    MV avg E/e' ratio 10.12     MV E/A ratio 1.43     LA vol index A/L 61.7 ml/m2    Tricuspid annular plane systolic excursion 2.3 cm    RV free wall pk S' 14.30 cm/s     LVIDd 4.80 cm    RVSP 33.9 mmHg    Aortic Valve Area by Continuity of VTI 1.82 cm2    Aortic Valve Area by Continuity of Peak Velocity 1.51 cm2    AV pk grad 8.4 mmHg    LV A4C EF 61.0    POCT GLUCOSE   Result Value Ref Range    POCT Glucose 88 74 - 99 mg/dL   POCT GLUCOSE   Result Value Ref Range    POCT Glucose 110 (H) 74 - 99 mg/dL   Calcium, Ionized   Result Value Ref Range    POCT Calcium, Ionized 1.02 (L) 1.1 - 1.33 mmol/L   Lactate   Result Value Ref Range    Lactate 4.1 (HH) 0.4 - 2.0 mmol/L   CBC   Result Value Ref Range    WBC 6.4 4.4 - 11.3 x10*3/uL    nRBC 0.0 0.0 - 0.0 /100 WBCs    RBC 3.16 (L) 4.00 - 5.20 x10*6/uL    Hemoglobin 10.1 (L) 12.0 - 16.0 g/dL    Hematocrit 28.6 (L) 36.0 - 46.0 %    MCV 91 80 - 100 fL    MCH 32.0 26.0 - 34.0 pg    MCHC 35.3 32.0 - 36.0 g/dL    RDW 12.8 11.5 - 14.5 %    Platelets 106 (L) 150 - 450 x10*3/uL   Heparin Assay, UFH   Result Value Ref Range    Heparin Unfractionated 0.2 See Comment Below for Therapeutic Ranges IU/mL   Lactate   Result Value Ref Range    Lactate 6.8 (HH) 0.4 - 2.0 mmol/L   Phosphorus   Result Value Ref Range    Phosphorus 2.2 (L) 2.5 - 4.9 mg/dL   Magnesium   Result Value Ref Range    Magnesium 1.30 (L) 1.60 - 2.40 mg/dL   Comprehensive metabolic panel   Result Value Ref Range    Glucose 79 74 - 99 mg/dL    Sodium 122 (L) 136 - 145 mmol/L    Potassium 4.1 3.5 - 5.3 mmol/L    Chloride 94 (L) 98 - 107 mmol/L    Bicarbonate 14 (L) 21 - 32 mmol/L    Anion Gap 18 10 - 20 mmol/L    Urea Nitrogen 11 6 - 23 mg/dL    Creatinine 0.44 (L) 0.50 - 1.05 mg/dL    eGFR >90 >60 mL/min/1.73m*2    Calcium 6.5 (L) 8.6 - 10.3 mg/dL    Albumin 2.1 (L) 3.4 - 5.0 g/dL    Alkaline Phosphatase 24 (L) 33 - 136 U/L    Total Protein 3.5 (L) 6.4 - 8.2 g/dL     (H) 9 - 39 U/L    Bilirubin, Total 0.4 0.0 - 1.2 mg/dL    ALT 75 (H) 7 - 45 U/L   POCT GLUCOSE   Result Value Ref Range    POCT Glucose 122 (H) 74 - 99 mg/dL   POCT GLUCOSE   Result Value Ref Range    POCT Glucose  126 (H) 74 - 99 mg/dL   Renal Function Panel   Result Value Ref Range    Glucose 113 (H) 74 - 99 mg/dL    Sodium 132 (L) 136 - 145 mmol/L    Potassium 4.1 3.5 - 5.3 mmol/L    Chloride 101 98 - 107 mmol/L    Bicarbonate 24 21 - 32 mmol/L    Anion Gap 11 10 - 20 mmol/L    Urea Nitrogen 16 6 - 23 mg/dL    Creatinine 0.84 0.50 - 1.05 mg/dL    eGFR 73 >60 mL/min/1.73m*2    Calcium 8.7 8.6 - 10.3 mg/dL    Phosphorus 3.4 2.5 - 4.9 mg/dL    Albumin 3.3 (L) 3.4 - 5.0 g/dL   Troponin I, High Sensitivity   Result Value Ref Range    Troponin I, High Sensitivity 4,099 (HH) 0 - 13 ng/L   CBC   Result Value Ref Range    WBC 8.2 4.4 - 11.3 x10*3/uL    nRBC 0.0 0.0 - 0.0 /100 WBCs    RBC 4.10 4.00 - 5.20 x10*6/uL    Hemoglobin 12.1 12.0 - 16.0 g/dL    Hematocrit 37.0 36.0 - 46.0 %    MCV 90 80 - 100 fL    MCH 29.5 26.0 - 34.0 pg    MCHC 32.7 32.0 - 36.0 g/dL    RDW 13.0 11.5 - 14.5 %    Platelets 138 (L) 150 - 450 x10*3/uL   Heparin Assay, UFH   Result Value Ref Range    Heparin Unfractionated 0.4 See Comment Below for Therapeutic Ranges IU/mL     ECG 12 lead    Result Date: 5/7/2024  Normal sinus rhythm Rightward axis Nonspecific intraventricular block T wave abnormality, consider inferolateral ischemia Abnormal ECG No previous ECGs available See ED provider note for full interpretation and clinical correlation Confirmed by Justyna Howard ([id number]) on 5/7/2024 7:08:10 PM    Transthoracic Echo (TTE) Complete    Result Date: 5/7/2024   [facility], [address] [place] [address]              Tel [phone] and Fax [phone] TRANSTHORACIC ECHOCARDIOGRAM REPORT  Patient Name:      PIYUSH Humphreys Physician:    [id number] Danyel Smith DO Study Date:        5/7/2024             Ordering Provider:    [id number] DAWNA REID MRN/PID:           [id number]             Fellow: Accession#:        [id number]         Nurse: Date of Birth/Age: 1950  / 74 years  Sonographer:          Venita Jon RDCS Gender:            F                    Additional Staff: Height:            157.48 cm            Admit Date:           5/6/2024 Weight:            75.30 kg             Admission Status:     Inpatient -                                                               Routine BSA / BMI:         1.77 m2 / 30.36      Encounter#:           [id number]                    kg/m2                                         Department Location:  [facility] Blood Pressure: 133 /58 mmHg Study Type:    TRANSTHORACIC ECHO (TTE) COMPLETE Diagnosis/ICD: Cardiac arrest, cause unspecified-I46.9 Indication:    Cardiac Arrest CPT Code:      Echo Complete w Full [id number] Patient History: Pertinent History: HTN and Hyperlipidemia. Study Detail: The following Echo studies were performed: 2D, M-Mode, Doppler and               color flow. The patient is intubated.  PHYSICIAN INTERPRETATION: Left Ventricle: The left ventricular systolic function is normal, with an estimated ejection fraction of 55-60%. The calculated ejection fraction is normal at 60 % using the Soto's Bi-plane MOD calculation. There are no regional wall motion abnormalities. The left ventricular cavity size is normal. The left ventricular septal wall thickness is mildly increased. There is normal left ventricular posterior wall thickness. Spectral Doppler shows an abnormal pattern of left ventricular diastolic filling. Left Atrium: The left atrium is severely dilated. Right Ventricle: The right ventricle is slightly enlarged. There is normal right ventricular global systolic function. Right Atrium: The right atrium is moderately dilated. Aortic Valve: The aortic valve is trileaflet. There is trivial aortic valve regurgitation. The peak instantaneous gradient of the aortic valve is 8.4 mmHg. The mean gradient of the aortic valve is 4.0 mmHg. Mitral Valve: The mitral valve is mildly thickened. There is trace mitral valve  regurgitation. Tricuspid Valve: The tricuspid valve is structurally normal. There is trace to mild tricuspid regurgitation. The Doppler estimated RVSP is slightly elevated at 33.9 mmHg. Pulmonic Valve: The pulmonic valve is structurally normal. There is trace to mild pulmonic valve regurgitation. Pericardium: There is no pericardial effusion noted. Aorta: The aortic root is normal. There is upper limits of normal dilatation of the ascending aorta. The aortic root is at the upper limits of normal size. Systemic Veins: The inferior vena cava appears to be of normal size. In comparison to the previous echocardiogram(s): There are no prior studies on this patient for comparison purposes.  CONCLUSIONS:  1. Left ventricular systolic function is normal with a 55-60% estimated ejection fraction.  2. Spectral Doppler shows an abnormal pattern of left ventricular diastolic filling.  3. The left atrium is severely dilated.  4. The right atrium is moderately dilated.  5. Slightly elevated RVSP. QUANTITATIVE DATA SUMMARY: 2D MEASUREMENTS:                           Normal Ranges: Ao Root d:     3.80 cm    (2.0-3.7cm) LAs:           4.00 cm    (2.7-4.0cm) IVSd:          1.20 cm    (0.6-1.1cm) LVPWd:         0.90 cm    (0.6-1.1cm) LVIDd:         4.80 cm    (3.9-5.9cm) LVIDs:         3.10 cm LV Mass Index: 103.0 g/m2 LV % FS        35.4 % LA VOLUME:                               Normal Ranges: LA Vol A4C:        98.9 ml    (22+/-6mL/m2) LA Vol A2C:        118.4 ml LA Vol BP:         109.0 ml LA Vol Index A4C:  56.0ml/m2 LA Vol Index A2C:  67.1 ml/m2 LA Vol Index BP:   61.7 ml/m2 LA Area A4C:       27.9 cm2 LA Area A2C:       30.3 cm2 LA Major Axis A4C: 6.7 cm LA Major Axis A2C: 6.6 cm LA Volume Index:   60.7 ml/m2 RA VOLUME BY A/L METHOD:                               Normal Ranges: RA Vol A4C:        94.7 ml    (8.3-19.5ml) RA Vol Index A4C:  53.6 ml/m2 RA Area A4C:       25.9 cm2 RA Major Axis A4C: 6.0 cm M-MODE MEASUREMENTS:                   Normal Ranges: Ao Root: 3.30 cm (2.0-3.7cm) LAs:     4.80 cm (2.7-4.0cm) AORTA MEASUREMENTS:                      Normal Ranges: Ao Sinus, d: 3.80 cm (2.1-3.5cm) Ao STJ, d:   2.92 cm (1.7-3.4cm) Asc Ao, d:   3.60 cm (2.1-3.4cm) LV SYSTOLIC FUNCTION BY 2D PLANIMETRY (MOD):                     Normal Ranges: EF-A4C View: 61.0 % (>=55%) EF-A2C View: 58.8 % EF-Biplane:  60.3 % LV DIASTOLIC FUNCTION:                               Normal Ranges: MV Peak E:        0.61 m/s    (0.7-1.2 m/s) MV Peak A:        0.42 m/s    (0.42-0.7 m/s) E/A Ratio:        1.43        (1.0-2.2) MV e'             0.06 m/s    (>8.0) MV lateral e'     0.06 m/s MV medial e'      0.04 m/s MV A Dur:         132.00 msec E/e' Ratio:       10.12       (<8.0) a'                0.06 m/s PulmV Sys Hector:    46.90 cm/s PulmV Rivera Hector:   44.90 cm/s PulmV S/D Hector:    1.00 PulmV A Revs Hector: 19.30 cm/s PulmV A Revs Dur: 103.00 msec MITRAL VALVE:                 Normal Ranges: MV DT: 230 msec (150-240msec) AORTIC VALVE:                                   Normal Ranges: AoV Vmax:                1.45 m/s (<=1.7m/s) AoV Peak P.4 mmHg (<20mmHg) AoV Mean P.0 mmHg (1.7-11.5mmHg) LVOT Max Hector:            0.70 m/s (<=1.1m/s) AoV VTI:                 25.70 cm (18-25cm) LVOT VTI:                14.90 cm LVOT Diameter:           2.00 cm  (1.8-2.4cm) AoV Area, VTI:           1.82 cm2 (2.5-5.5cm2) AoV Area,Vmax:           1.51 cm2 (2.5-4.5cm2) AoV Dimensionless Index: 0.58  RIGHT VENTRICLE: RV Basal 4.30 cm RV Mid   3.16 cm RV Major 6.9 cm TAPSE:   22.5 mm RV s'    0.14 m/s TRICUSPID VALVE/RVSP:                             Normal Ranges: Peak TR Velocity: 2.78 m/s Est. RA Pressure: 3 mmHg RV Syst Pressure: 33.9 mmHg (< 30mmHg) IVC Diam:         1.93 cm PULMONIC VALVE:                         Normal Ranges: PV Accel Time: 129 msec (>120ms) PV Max Hector:    0.6 m/s  (0.6-0.9m/s) PV Max P.3 mmHg PIEDV:         1.14 m/s PADP:           8.2 mmHg Pulmonary Veins: PulmV A Revs Dur: 103.00 msec PulmV A Revs Hector: 19.30 cm/s PulmV Rivera Hector:   44.90 cm/s PulmV S/D Hector:    1.00 PulmV Sys Hector:    46.90 cm/s  [id number] Danyel Smith  Electronically signed on 5/7/2024 at 5:17:53 PM  ** Final **     ECG 12 lead    Result Date: 5/7/2024  Undetermined rhythm Incomplete right bundle branch block Possible Inferior infarct , age undetermined ST & T wave abnormality, consider anterolateral ischemia or digitalis effect Prolonged QT Abnormal ECG Confirmed by Rafal Mclain ([id number]) on 5/7/2024 2:41:21 PM    CT brain attack angio head and neck W and WO IV contrast    Result Date: 5/6/2024  Interpreted By:  Gino Mccord,  and Bryson Saravanan STUDY: CT BRAIN ATTACK ANGIO HEAD AND NECK W AND WO IV CONTRAST   INDICATION: Signs/Symptoms:unresponsive, history of aneurysm s/p clip   COMPARISON: Noncontrast CT of the head performed same day.   ACCESSION NUMBER(S): [id number]   ORDERING CLINICIAN: RALEIGH TSANG   TECHNIQUE: Axial CT images of the head and neck were acquired following administration of 75 mL Omnipaque 350 intravenous contrast. Coronal and sagittal maximum intensity projection images were provided.   FINDINGS: Please refer to separately dictated noncontrast CT of the head performed same day for respective intracranial findings.   CTA head:   Beam hardening artifact secondary to suggested clipping or coiling involving the region of the cavernous or supra segment right internal carotid artery degrades image quality. There is mild vascular calcifications most pronounced within the cavernous and clinoid segments bilaterally. The anterior and middle cerebral arteries are patent bilaterally. The intracranial vertebral arteries, vertebrobasilar junction, basilar artery and posterior cerebral arteries are patent bilaterally.   There is no evidence of aneurysm or vascular malformation.   CTA neck:   Image quality is degraded by beam hardening artifact  secondary to intravenous contrast.   The visualized aortic arch and origin of the great vessels patent with scattered calcifications noted.   The common carotid arteries, carotid bifurcations and cervical internal carotid arteries are patent bilaterally. Eccentric calcifications at the carotid bifurcations and proximal cervical internal carotid arteries are without stenosis as measured per NASCET criteria.   The vertebral arteries originate from the subclavian arteries bilaterally are patent throughout cervical course.   And endotracheal tube terminates at the level of the edda. Partially visualized enteric tube extends below the field of view. The visualized upper lungs demonstrate biapical opacities compatible with pleural-parenchymal scarring. Multilevel degenerative changes of the cervical spine are most pronounced at C4-5, C5-6 and C6-7 where there are disc osteophyte complexes, uncovertebral joint hypertrophy and facet arthropathy.       Status post clipping or coiling involving the region of the supra clinoid more likely than cavernous segment right internal carotid artery somewhat degrades image quality. Within this limitation, there is no flow-limiting stenosis or occlusion identified within head or neck. MRI would be more sensitive and specific if recent ischemia were clinically suspected.   Please refer to separately dictated noncontrast CT of the head performed same day for respective intracranial findings.   I personally reviewed the images/study and I agree with the findings as stated by Dr. Massey.   MACRO: Saravanan Massey discussed the significance and urgency of this critical finding by telephone with  RALEIGH TSANG on 5/6/2024 at 5:20 pm.  (**-RCF-**) Findings:  See findings.   Signed by: Gino Mccord 5/6/2024 5:29 PM Dictation workstation:   [id number]    CT brain attack head wo IV contrast    Result Date: 5/6/2024  Interpreted By:  Netta Armenta, STUDY: CT BRAIN ATTACK HEAD WO  IV CONTRAST;  5/6/2024 4:49 pm   INDICATION: Unresponsiveness. Altered mental status.   COMPARISON: None.   ACCESSION NUMBER(S): [id number]   ORDERING CLINICIAN: RALEIGH TSANG   TECHNIQUE: Noncontrast axial CT scan of head was performed. Angled reformats in brain and bone windows were generated. The images were reviewed in bone, brain, blood and soft tissue windows.   FINDINGS: CSF Spaces: The ventricles, sulci and basal cisterns are slightly prominent. There is no extraaxial fluid collection.   Parenchyma: There are surgical clips seen along the right side of the tzvojx-rx-Moznle. A focal area of encephalomalacia with transcortical extension is seen within the right frontal lobe. There is an old lacunar infarct of the right basal ganglia identified. No mass effect, midline shift, or intracranial hemorrhage is seen. No hyperdense MCA sign is visible. There is some diminished density seen within the white matter of each cerebral hemisphere indicating chronic microvascular ischemic disease.   Calvarium: The calvarium is unremarkable.   Paranasal sinuses and mastoids: Visualized paranasal sinuses and mastoids are clear.       Mild atrophy and chronic microvascular ischemic disease.   Old lacunar infarct right basal ganglia.   Focal encephalomalacia within the right frontal lobe.   Surgical clips along the right side of the [id number].   No acute intracranial process is seen.   MACRO: Netta Armenta discussed the significance and urgency of this critical finding by telephone with  RALEIGH TSANG on 5/6/2024 at 5:00 pm.  (**-RCF-**) Findings:  See findings.       Signed by: Netta Armenta 5/6/2024 5:01 PM Dictation workstation:   [id number]    XR chest 1 view    Result Date: 5/6/2024  STUDY: Chest Radiograph;  05/06/2024 4:13 PM INDICATION: Cardiac arrest. COMPARISON: None Available ACCESSION NUMBER(S): [id number] ORDERING CLINICIAN: RALEIGH TSANG TECHNIQUE:  Frontal chest  (four images) was obtained at 16:12 hours. FINDINGS: CARDIOMEDIASTINAL SILHOUETTE: The endotracheal tube is noted in place.  The tip is about 4.2 cm above the level of the edda.  Enteric tube traverses the mediastinum and extends into the stomach, the distal end is not included on the present study. Cardiomediastinal silhouette is normal in size.  There is mild elongation and calcification of the thoracic aorta.  LUNGS: Lungs are clear.  There is no consolidation, pleural effusion, or pneumothorax.  ABDOMEN: No remarkable upper abdominal findings.  BONES: No acute osseous changes.    1.Normal heart size with no radiographic signs of active pulmonary parenchymal infiltration. 2.Endotracheal tube and enteric tube in place. 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 The patient was reportedly found unresponsive by a neighbor next to her  after being down for an unknown time. No reported bystander CPR.  EMS was called and she was pulseless, with initial rhythm VF. She was administered 2 rounds of epinephrine, 1 dose of amiodarone, and 3 attempts at defibrillation, the last of which was at 360 J. She was intubated in the field. She was taken to [facility] ED where EKG demonstrated  diffuse ST depressions, most notably inferolaterally.  The cath lab was activated, however, per interventional cardiology, due to poor neurological exam, she was not taken for intervention at that time. She was subsequently noted to be flexor posturing. CTH with surgical coiling in place, old lacunar R basal ganglia infarct, RF encephalomalacia, but no LVO or acute process. She was  ultimately started on heparin gtt after review of imaging by NSGY given history of aneurysm, and per cardiology recommendations.  She was started on TTM and subsequently admitted to the ICU.      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)     PLAN:  -A-F bundle  -Serial pain and neurological assessments  -Neurology consulted to assist in post-arrest prognostication - appreciate input  -MRI brain post arrest day 3 per recommendations  -vEEG yesterday AM concerning for myoclonus status - follow-up read today  -Given 20 mg/kg load of LEV, followed by 750 mg BID initially per discussion with neurology  -Due to continued myoclonus, will plan for additional 20 mg/kg load this AM and 1250 mg BID maintaince for now per recommendations  -Discuss with neurology additional AED/ASM given continued myoclonus with sedation paused   -Neuroprotective measures including but not limited to avoidance of hyperthermia, hypotension; correction of metabolic anomalies as able   -Underwent TTM per protocol with goal 36C, rewarmed overnight   -Wean sedation as able for neurological assessments     CV  #VF arrest  #NSTEMI  #History of HTN     PLAN:  -Cath lab activated on arrival in ED given VF arrest and EKG findings as noted above - not a candidate at present given poor neurological status per interventional cardiology - appreciate input-  -General cardiology consulted - started on heparin gtt per recommendations given elevated troponin, concern for LM disease/significant CAD - appreciate input  -ASA, high intensity statin, heparin per cardiology recommendations  -Troponin peaked 4287, now downtrending this AM  -Continuous telemetry and NIBP monitoring   -Hold home antihypertensives for now  -TTE 5/7     CONCLUSIONS:   1. Left ventricular systolic function is normal with a 55-60% estimated ejection fraction.   2.  Spectral Doppler shows an abnormal pattern of left ventricular diastolic filling.   3. The left atrium is severely dilated.   4. The right atrium is moderately dilated.   5. Slightly elevated RVSP.    Pulmonary  #Hypoxemic respiratory failure in setting of cardiopulmonary arrest      PLAN:  -Intubated in field prior to arrival  -VAP bundle  -Wean ventilator settings as able     Renal     PLAN:  -Trend strict I/O  -Follow RFP/metabolic panel  -Replete electrolytes as indicated  -Will plan to adjust FWF due to hyponatremia     GI     PLAN:  -PPI for GI ppx  -Continue TF     Endocrine      PLAN:  -Follow metabolic panel  -POCT glucose/SSI as indicated     Hematologic     PLAN:  -Trend CBC  -Heparin gtt as above  -SCDs     ID     PLAN:  -Trend temperature/WBC curve  -Lactate elevated overnight, no hypotension, no leukocytosis or other clear signs of infectious process this AM - repeat lactate this AM normal  -UA negative LE/nitrites   -CXR 5/6 without consolidation      ICU checklist:  Diet: TF   GI Prophylaxis: PPI  DVT Prophylaxis: On therapeutic heparin gtt  Code Status: FULL. Updated son at bedside this AM.   Dispo: Maintain in ICU            I spent 61 minutes in the professional and overall care of this patient.      Po Cantor MD

## 2024-05-09 ENCOUNTER — DOCUMENTATION (OUTPATIENT)
Dept: INPATIENT UNIT | Facility: HOSPITAL | Age: 74
End: 2024-05-09
Payer: MEDICARE

## 2024-05-09 ENCOUNTER — APPOINTMENT (OUTPATIENT)
Dept: CARDIOLOGY | Facility: HOSPITAL | Age: 74
DRG: 283 | End: 2024-05-09
Payer: MEDICARE

## 2024-05-09 LAB
ALBUMIN SERPL BCP-MCNC: 3.4 G/DL (ref 3.4–5)
ANION GAP SERPL CALC-SCNC: 10 MMOL/L (ref 10–20)
BUN SERPL-MCNC: 12 MG/DL (ref 6–23)
CALCIUM SERPL-MCNC: 7.8 MG/DL (ref 8.6–10.3)
CHLORIDE SERPL-SCNC: 102 MMOL/L (ref 98–107)
CO2 SERPL-SCNC: 27 MMOL/L (ref 21–32)
CREAT SERPL-MCNC: 0.67 MG/DL (ref 0.5–1.05)
EGFRCR SERPLBLD CKD-EPI 2021: >90 ML/MIN/1.73M*2
ERYTHROCYTE [DISTWIDTH] IN BLOOD BY AUTOMATED COUNT: 13.1 % (ref 11.5–14.5)
GLUCOSE BLD MANUAL STRIP-MCNC: 111 MG/DL (ref 74–99)
GLUCOSE BLD MANUAL STRIP-MCNC: 124 MG/DL (ref 74–99)
GLUCOSE BLD MANUAL STRIP-MCNC: 78 MG/DL (ref 74–99)
GLUCOSE SERPL-MCNC: 104 MG/DL (ref 74–99)
HCT VFR BLD AUTO: 35.3 % (ref 36–46)
HGB BLD-MCNC: 11.6 G/DL (ref 12–16)
MAGNESIUM SERPL-MCNC: 1.7 MG/DL (ref 1.6–2.4)
MCH RBC QN AUTO: 29.7 PG (ref 26–34)
MCHC RBC AUTO-ENTMCNC: 32.9 G/DL (ref 32–36)
MCV RBC AUTO: 91 FL (ref 80–100)
NRBC BLD-RTO: 0 /100 WBCS (ref 0–0)
PHOSPHATE SERPL-MCNC: 3 MG/DL (ref 2.5–4.9)
PLATELET # BLD AUTO: 125 X10*3/UL (ref 150–450)
POTASSIUM SERPL-SCNC: 3.9 MMOL/L (ref 3.5–5.3)
RBC # BLD AUTO: 3.9 X10*6/UL (ref 4–5.2)
SODIUM SERPL-SCNC: 135 MMOL/L (ref 136–145)
WBC # BLD AUTO: 5.3 X10*3/UL (ref 4.4–11.3)

## 2024-05-09 PROCEDURE — 82947 ASSAY GLUCOSE BLOOD QUANT: CPT

## 2024-05-09 PROCEDURE — 2500000004 HC RX 250 GENERAL PHARMACY W/ HCPCS (ALT 636 FOR OP/ED): Mod: MUE | Performed by: EMERGENCY MEDICINE

## 2024-05-09 PROCEDURE — 99232 SBSQ HOSP IP/OBS MODERATE 35: CPT | Performed by: PSYCHIATRY & NEUROLOGY

## 2024-05-09 PROCEDURE — 2500000001 HC RX 250 WO HCPCS SELF ADMINISTERED DRUGS (ALT 637 FOR MEDICARE OP): Performed by: NURSE PRACTITIONER

## 2024-05-09 PROCEDURE — 2500000005 HC RX 250 GENERAL PHARMACY W/O HCPCS: Performed by: EMERGENCY MEDICINE

## 2024-05-09 PROCEDURE — 82565 ASSAY OF CREATININE: CPT | Mod: 91 | Performed by: NURSE PRACTITIONER

## 2024-05-09 PROCEDURE — 2500000004 HC RX 250 GENERAL PHARMACY W/ HCPCS (ALT 636 FOR OP/ED): Performed by: NURSE PRACTITIONER

## 2024-05-09 PROCEDURE — 94681 O2 UPTK CO2 OUTP % O2 XTRC: CPT

## 2024-05-09 PROCEDURE — 2020000001 HC ICU ROOM DAILY

## 2024-05-09 PROCEDURE — 94003 VENT MGMT INPAT SUBQ DAY: CPT

## 2024-05-09 PROCEDURE — 2500000001 HC RX 250 WO HCPCS SELF ADMINISTERED DRUGS (ALT 637 FOR MEDICARE OP)

## 2024-05-09 PROCEDURE — 93005 ELECTROCARDIOGRAM TRACING: CPT

## 2024-05-09 PROCEDURE — 2500000004 HC RX 250 GENERAL PHARMACY W/ HCPCS (ALT 636 FOR OP/ED): Performed by: EMERGENCY MEDICINE

## 2024-05-09 PROCEDURE — 2500000004 HC RX 250 GENERAL PHARMACY W/ HCPCS (ALT 636 FOR OP/ED): Performed by: STUDENT IN AN ORGANIZED HEALTH CARE EDUCATION/TRAINING PROGRAM

## 2024-05-09 PROCEDURE — 83735 ASSAY OF MAGNESIUM: CPT | Performed by: EMERGENCY MEDICINE

## 2024-05-09 PROCEDURE — 99232 SBSQ HOSP IP/OBS MODERATE 35: CPT | Performed by: NURSE PRACTITIONER

## 2024-05-09 PROCEDURE — 36415 COLL VENOUS BLD VENIPUNCTURE: CPT | Performed by: NURSE PRACTITIONER

## 2024-05-09 PROCEDURE — C9113 INJ PANTOPRAZOLE SODIUM, VIA: HCPCS | Performed by: EMERGENCY MEDICINE

## 2024-05-09 PROCEDURE — 85027 COMPLETE CBC AUTOMATED: CPT | Performed by: NURSE PRACTITIONER

## 2024-05-09 PROCEDURE — 99291 CRITICAL CARE FIRST HOUR: CPT | Performed by: NURSE PRACTITIONER

## 2024-05-09 RX ORDER — HALOPERIDOL 5 MG/ML
1 INJECTION INTRAMUSCULAR EVERY 4 HOURS PRN
Status: DISCONTINUED | OUTPATIENT
Start: 2024-05-09 | End: 2024-05-10 | Stop reason: HOSPADM

## 2024-05-09 RX ORDER — LORAZEPAM 2 MG/ML
0.5 INJECTION INTRAMUSCULAR EVERY 4 HOURS PRN
Status: DISCONTINUED | OUTPATIENT
Start: 2024-05-09 | End: 2024-05-09

## 2024-05-09 RX ORDER — LORAZEPAM 2 MG/ML
2 INJECTION INTRAMUSCULAR EVERY 10 MIN PRN
Status: COMPLETED | OUTPATIENT
Start: 2024-05-09 | End: 2024-05-09

## 2024-05-09 RX ORDER — LORAZEPAM 0.5 MG/1
0.5 TABLET ORAL EVERY 4 HOURS PRN
Status: DISCONTINUED | OUTPATIENT
Start: 2024-05-09 | End: 2024-05-10 | Stop reason: HOSPADM

## 2024-05-09 RX ORDER — LORAZEPAM 2 MG/ML
2 INJECTION INTRAMUSCULAR ONCE
Status: COMPLETED | OUTPATIENT
Start: 2024-05-09 | End: 2024-05-09

## 2024-05-09 RX ORDER — MORPHINE SULFATE IN 0.9 % NACL 30 MG/30ML
4 PATIENT CONTROLLED ANALGESIA SYRINGE INTRAVENOUS CONTINUOUS
Status: DISCONTINUED | OUTPATIENT
Start: 2024-05-09 | End: 2024-05-10 | Stop reason: HOSPADM

## 2024-05-09 RX ORDER — ACETAMINOPHEN 650 MG/1
650 SUPPOSITORY RECTAL EVERY 4 HOURS PRN
Status: DISCONTINUED | OUTPATIENT
Start: 2024-05-09 | End: 2024-05-10 | Stop reason: HOSPADM

## 2024-05-09 RX ADMIN — LORAZEPAM 2 MG: 2 INJECTION INTRAMUSCULAR; INTRAVENOUS at 14:25

## 2024-05-09 RX ADMIN — ASPIRIN 81 MG 81 MG: 81 TABLET ORAL at 09:08

## 2024-05-09 RX ADMIN — HALOPERIDOL LACTATE 1 MG: 5 INJECTION, SOLUTION INTRAMUSCULAR at 13:23

## 2024-05-09 RX ADMIN — PROPOFOL 60 MCG/KG/MIN: 10 INJECTION, EMULSION INTRAVENOUS at 05:41

## 2024-05-09 RX ADMIN — MORPHINE SULFATE 2 MG/HR: 10 INJECTION INTRAVENOUS at 13:34

## 2024-05-09 RX ADMIN — PROPOFOL 60 MCG/KG/MIN: 10 INJECTION, EMULSION INTRAVENOUS at 11:55

## 2024-05-09 RX ADMIN — PROPOFOL 60 MCG/KG/MIN: 10 INJECTION, EMULSION INTRAVENOUS at 02:07

## 2024-05-09 RX ADMIN — POLYETHYLENE GLYCOL 3350 17 G: 17 POWDER, FOR SOLUTION ORAL at 09:08

## 2024-05-09 RX ADMIN — FENTANYL CITRATE 25 MCG: 0.05 INJECTION, SOLUTION INTRAMUSCULAR; INTRAVENOUS at 17:05

## 2024-05-09 RX ADMIN — LEVETIRACETAM 2000 MG: 100 INJECTION, SOLUTION INTRAVENOUS at 09:11

## 2024-05-09 RX ADMIN — PANTOPRAZOLE SODIUM 40 MG: 40 INJECTION, POWDER, FOR SOLUTION INTRAVENOUS at 09:08

## 2024-05-09 RX ADMIN — GLYCOPYRROLATE 0.2 MG: 0.2 INJECTION, SOLUTION INTRAMUSCULAR; INTRAVITREAL at 14:06

## 2024-05-09 RX ADMIN — GLYCOPYRROLATE 0.2 MG: 0.2 INJECTION, SOLUTION INTRAMUSCULAR; INTRAVITREAL at 17:28

## 2024-05-09 RX ADMIN — PROPOFOL 60 MCG/KG/MIN: 10 INJECTION, EMULSION INTRAVENOUS at 09:01

## 2024-05-09 RX ADMIN — LORAZEPAM 2 MG: 2 INJECTION INTRAMUSCULAR; INTRAVENOUS at 14:06

## 2024-05-09 RX ADMIN — LORAZEPAM 2 MG: 2 INJECTION INTRAMUSCULAR; INTRAVENOUS at 14:31

## 2024-05-09 RX ADMIN — Medication 30 PERCENT: at 07:24

## 2024-05-09 RX ADMIN — PROPOFOL 60 MCG/KG/MIN: 10 INJECTION, EMULSION INTRAVENOUS at 10:19

## 2024-05-09 ASSESSMENT — COGNITIVE AND FUNCTIONAL STATUS - GENERAL
HELP NEEDED FOR BATHING: TOTAL
WALKING IN HOSPITAL ROOM: TOTAL
CLIMB 3 TO 5 STEPS WITH RAILING: TOTAL
DRESSING REGULAR LOWER BODY CLOTHING: TOTAL
TURNING FROM BACK TO SIDE WHILE IN FLAT BAD: TOTAL
STANDING UP FROM CHAIR USING ARMS: TOTAL
DAILY ACTIVITIY SCORE: 6
EATING MEALS: TOTAL
TOILETING: TOTAL
MOVING FROM LYING ON BACK TO SITTING ON SIDE OF FLAT BED WITH BEDRAILS: TOTAL
MOVING TO AND FROM BED TO CHAIR: TOTAL
MOBILITY SCORE: 6
DRESSING REGULAR UPPER BODY CLOTHING: TOTAL
PERSONAL GROOMING: TOTAL

## 2024-05-09 NOTE — CARE PLAN
Met with adult sons Sánchez and Andrey at bedside. They had reviewed the patient's advanced directive paperwork. They reported that she highly valued her independence and quality of life, and she would not want to be dependent on mechanical ventilation, artificial nutrition, or intensive nursing care for any significant period of time. They reported that the do not believe she would wish to live in current state.  Discussed continued myoclonic status and coma post-arrest.  Discussed neurology input including concern that above have traditionally been viewed as poor prognostic signs for functional neurological recovery.  Patient's sons requested change in code status to DNR, but requested all current therapies be continued for now. 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 Due to recurrence, will plan to uptitrate propofol and increase LEV dosing per neurology input.) Family did reported that they would likely transition code status to DNR comfort measures only with likely plan for compassionate extubation tomorrow. An opportunity to ask questions was provided and all that were expressed were addressed at that time. The family members verbalized understanding and were in agreement with plan.

## 2024-05-09 NOTE — PROGRESS NOTES
Has remained in coma without improvement.   Note goals of care conversations and agree that plan for comfort measures is very appropriate.

## 2024-05-09 NOTE — PROGRESS NOTES
"Subjective Data:  Remains sedated  Review plans for possible comfort care measures/extubation today     Telemetry SR HR 60s     Objective Data:  Last Recorded Vitals:  Vitals:    05/09/24 1000 05/09/24 1100 05/09/24 1132 05/09/24 1200   BP: 87/51 (!) 85/49  94/51   Pulse: 60 60  66   Resp: 18 18 18 18   Temp: 37 °C (98.6 °F) 37.2 °C (99 °F)  37.2 °C (99 °F)   TempSrc:       SpO2: 98% 98% 98% 98%   Weight:       Height:           Last Labs:  LABS:  CMP:  Results from last 7 days   Lab Units 05/09/24  0525 05/08/24  1013 05/08/24  0535 05/07/24  2350 05/07/24  1038 05/07/24  0608 05/06/24  1558   SODIUM mmol/L 135* 131* 132* 122* 137 136 136   POTASSIUM mmol/L 3.9 4.0 4.1 4.1 4.2 4.6 3.7   CHLORIDE mmol/L 102 99 101 94* 101 101 100   CO2 mmol/L 27 25 24 14* 23 20* 19*   ANION GAP mmol/L 10 11 11 18 17 20 21*   BUN mg/dL 12 16 16 11 19 21 16   CREATININE mg/dL 0.67 0.82 0.84 0.44* 0.83 0.96 1.03   EGFR mL/min/1.73m*2 >90 76 73 >90 75 63 58*   MAGNESIUM mg/dL 1.70 1.80  --  1.30* 2.00  --   --    ALBUMIN g/dL 3.4 3.1* 3.3* 2.1* 3.8 3.9 3.8   ALT U/L  --  109*  --  75* 146*  --  78*   AST U/L  --  142*  --  116* 188*  --  71*   BILIRUBIN TOTAL mg/dL  --  0.3  --  0.4 0.6  --  0.3     CBC:  Results from last 7 days   Lab Units 05/09/24  0525 05/08/24  1013 05/08/24  0535 05/07/24  2111 05/07/24  1038 05/07/24  0608 05/06/24  1558   WBC AUTO x10*3/uL 5.3 7.9 8.2 6.4 9.7 10.4 6.0   HEMOGLOBIN g/dL 11.6* 11.5* 12.1 10.1* 13.4 12.9 11.9*   HEMATOCRIT % 35.3* 34.4* 37.0 28.6* 41.2 40.2 36.8   PLATELETS AUTO x10*3/uL 125* 137* 138* 106* 147* 137* 163   MCV fL 91 91 90 91 92 96 92     COAG:     ABO: No results found for: \"ABO\"  HEME/ENDO:  Results from last 7 days   Lab Units 05/07/24  1041   HEMOGLOBIN A1C % 5.3      CARDIAC:   Results from last 7 days   Lab Units 05/08/24  0535 05/07/24  1038 05/06/24  1704 05/06/24  1558   TROPHS ng/L 4,099* 4,287* 683* 185*   BNP pg/mL  --   --   --  509*     Recent Labs     05/07/24  0608 "   CHOL 194   LDLCALC 117*   HDL 64.3   TRIG 64      Imagine results  Transthoracic Echo (TTE) Complete   Final Result      CT brain attack angio head and neck W and WO IV contrast   Final Result   Status post clipping or coiling involving the region of the supra   clinoid more likely than cavernous segment right internal carotid   artery somewhat degrades image quality. Within this limitation, there   is no flow-limiting stenosis or occlusion identified within head or   neck. MRI would be more sensitive and specific if recent ischemia   were clinically suspected.        Please refer to separately dictated noncontrast CT of the head   performed same day for respective intracranial findings.        I personally reviewed the images/study and I agree with the findings   as stated by Dr. Massey.        MACRO:   Saravanan Massey discussed the significance and urgency of this   critical finding by telephone with  RALEIGH TSANG on   5/6/2024 at 5:20 pm.  (**-RCF-**) Findings:  See findings.        Signed by: Gino Mccord 5/6/2024 5:29 PM   Dictation workstation:   [id number]      CT brain attack head wo IV contrast   Final Result   Mild atrophy and chronic microvascular ischemic disease.        Old lacunar infarct right basal ganglia.        Focal encephalomalacia within the right frontal lobe.        Surgical clips along the right side of the [id number].        No acute intracranial process is seen.        MACRO:   Netta Armenta discussed the significance and urgency of this critical   finding by telephone with  RALEIGH TSANG on 5/6/2024 at   5:00 pm.  (**-RCF-**) Findings:  See findings.                  Signed by: Netta Armenta 5/6/2024 5:01 PM   Dictation workstation:   [id number]      XR chest 1 view   Final Result   1.Normal heart size with no radiographic signs of active   pulmonary parenchymal infiltration.   2.Endotracheal tube and enteric tube in place.   Signed by Briana Mason DO       MR brain wo IV contrast    (Results Pending)         Last I/O:  I/O last 3 completed shifts:  In: 3398.5 (45.1 mL/kg) [I.V.:1132.5 (15 mL/kg); NG/GT:1716; IV Piggyback:550]  Out: 2135 (28.4 mL/kg) [Urine:2135 (0.8 mL/kg/hr)]  Weight: 75.3 kg     Past Cardiology Tests (Last 3 Years):  EKG:  ECG 12 lead 05/07/2024      ECG 12 lead 05/06/2024    Echo:  Transthoracic Echo (TTE) Complete 05/07/2024   1. Left ventricular systolic function is normal with a 55-60% estimated ejection fraction.   2. Spectral Doppler shows an abnormal pattern of left ventricular diastolic filling.   3. The left atrium is severely dilated.   4. The right atrium is moderately dilated.   5. Slightly elevated RVSP.    Cath:  No results found for this or any previous visit from the past 1095 days.    Stress Test:  No results found for this or any previous visit from the past 1095 days.    Cardiac Imaging:  No results found for this or any previous visit from the past 1095 days.      Inpatient Medications:  Scheduled medications   Medication Dose Route Frequency    aspirin  81 mg oral Daily    atorvastatin  80 mg oral Nightly    insulin lispro  0-5 Units subcutaneous q4h    levETIRAcetam  2,000 mg intravenous q12h    oxygen   inhalation Continuous - Inhalation    pantoprazole  40 mg intravenous Daily    perflutren lipid microspheres  0.5-10 mL of dilution intravenous Once in imaging    perflutren protein A microsphere  0.5 mL intravenous Once in imaging    polyethylene glycol  17 g oral Daily    sulfur hexafluoride microsphr  2 mL intravenous Once in imaging     PRN medications   Medication    dextrose    dextrose    fentaNYL    glucagon    glucagon     Continuous Medications   Medication Dose Last Rate    propofol  5-60 mcg/kg/min 60 mcg/kg/min (05/09/24 1200)       Physical Exam:  General:  Sedated/Intubated on ventilatory support  Cardiovascular:  Regular rate and rhythm. No cardiac murmurs noted. Normal S1 and S2.  Pulmonary:  Clear to  auscultation bilaterally.  Abdomen:  Soft. Non-tender.   Non-distended.  Positive bowel sounds.  Lower Extremities:  2+ pedal pulses. No LE edema.  Neurologic:  Cranial nerves intact.  No focal deficit.   Skin: Skin warm and dry, normal skin turgor.         Assessment/Plan   Khadra Velazquez is a 74 y.o. 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  Per EMS report, patient was found down next to lawnmower by neighbors, EMS was called, on arrival CPR was initiated and when patient was connected to [facility] patient was found to be in V-fib, she was shocked x 3 at 200/300/360 joules; received 2 rounds of epi, Amio 150 bolus and was intubated before ROSC> which was obtained in the field. Post ROSC rhythm with sinus. EMS arrival to time of ROSC was 13 minutes. In the ED, Cath Lab was activated for diffuse ST depression found on EKG> given patient's neurologic function, any emergent cath lab procedures were deferred until Neurology has evaluated and given recommendations. Cardiology consulted for further evaluation of V-fib arrest, HS trop 185/683.       1.  Unwitnessed cardiac arrest/ Vfib Arrest (EMS arrival to time of ROSC was 13 minutes).  Shocked x 3 at 200/300/360 joules; received 2 rounds of epi, Amio 150 bolus and was intubated before ROSC> which was obtained in the field.   Post Arrest Rhythm: SR  Intubated w/Ventilatory Support  Concerns for possible anoxic brain injury. Neurology recommendations reviewed. Myoclonus noted with after sedation paused, very poor prognostic sign recovery.   Reviewed plans for possible comfort care measures/extubation.        2. NSTEMI  Troponin peaked at 4287 Echo showed preserved LV systolic function LVEF 55-60%.   C/w ASA, statin, hold BB d/t SB  Hold Ace/ ARB d/t soft BP's        3. HTN.  Soft/Acceptable given current circumstances. Monitoring     4.  Hyperlipidemia.   Continue statin        Recommendations as above:  Continue supportive care  Review plans for possible comfort care measures/extubation today      Code Status:  DNR      Harpreet Long, APRN-CNP

## 2024-05-09 NOTE — PROGRESS NOTES
05/09/24 1232   Discharge Planning   Patient expects to be discharged to: TBD       Patient is on continuous EEG. On propofol. Patient's son's made decision to proceed with compassionate extubation today.

## 2024-05-09 NOTE — PROGRESS NOTES
"Khadra Velazquez is a 74 y.o. female on day 3 of admission presenting with Cardiac arrest (Multi).    Subjective   REGIS reported  Remains intubated and sedated on propofol         Objective     Physical Exam  Constitutional:       Appearance: She is ill-appearing.      Interventions: She is intubated.      Comments: Laying in ICU bed on mechanical ventilation sedated on propofol and unrepsonsive   Cardiovascular:      Rate and Rhythm: Normal rate and regular rhythm.      Pulses: Normal pulses.   Pulmonary:      Effort: She is intubated.      Breath sounds: Decreased breath sounds present.   Abdominal:      General: There is no distension.      Palpations: Abdomen is soft.   Genitourinary:     Comments: Taylor catheter   Skin:     General: Skin is warm and dry.      Capillary Refill: Capillary refill takes less than 2 seconds.   Neurological:      Mental Status: She is unresponsive.         Last Recorded Vitals  Blood pressure 94/51, pulse 66, temperature 37.2 °C (99 °F), resp. rate 18, height 1.651 m (5' 5\"), weight 75.3 kg (166 lb 0.1 oz), SpO2 98%.  Intake/Output last 3 Shifts:  I/O last 3 completed shifts:  In: 3398.5 (45.1 mL/kg) [I.V.:1132.5 (15 mL/kg); NG/GT:1716; IV Piggyback:550]  Out: 2135 (28.4 mL/kg) [Urine:2135 (0.8 mL/kg/hr)]  Weight: 75.3 kg     Relevant Results    Scheduled medications  morphine, 2 mg, intravenous, Once      Continuous medications  morphine, 2 mg/hr      PRN medications  PRN medications: acetaminophen, fentaNYL, glycopyrrolate, haloperidol lactate, haloperidol lactate, LORazepam, LORazepam, oxygen    Results for orders placed or performed during the hospital encounter of 05/06/24 (from the past 24 hour(s))   ECG 12 lead   Result Value Ref Range    Ventricular Rate 48 BPM    Atrial Rate 48 BPM    NE Interval 160 ms    QRS Duration 132 ms    QT Interval 584 ms    QTC Calculation(Bazett) 521 ms    P Axis 21 degrees    R Axis 72 degrees    T Axis -72 degrees    QRS Count 8 beats    Q Onset 218 " ms    P Onset 138 ms    P Offset 186 ms    T Offset 510 ms    QTC Fredericia 542 ms   POCT GLUCOSE   Result Value Ref Range    POCT Glucose 99 74 - 99 mg/dL   Heparin Assay, UFH   Result Value Ref Range    Heparin Unfractionated 0.9 See Comment Below for Therapeutic Ranges IU/mL   POCT GLUCOSE   Result Value Ref Range    POCT Glucose 97 74 - 99 mg/dL   POCT GLUCOSE   Result Value Ref Range    POCT Glucose 92 74 - 99 mg/dL   POCT GLUCOSE   Result Value Ref Range    POCT Glucose 111 (H) 74 - 99 mg/dL   CBC   Result Value Ref Range    WBC 5.3 4.4 - 11.3 x10*3/uL    nRBC 0.0 0.0 - 0.0 /100 WBCs    RBC 3.90 (L) 4.00 - 5.20 x10*6/uL    Hemoglobin 11.6 (L) 12.0 - 16.0 g/dL    Hematocrit 35.3 (L) 36.0 - 46.0 %    MCV 91 80 - 100 fL    MCH 29.7 26.0 - 34.0 pg    MCHC 32.9 32.0 - 36.0 g/dL    RDW 13.1 11.5 - 14.5 %    Platelets 125 (L) 150 - 450 x10*3/uL   Renal Function Panel   Result Value Ref Range    Glucose 104 (H) 74 - 99 mg/dL    Sodium 135 (L) 136 - 145 mmol/L    Potassium 3.9 3.5 - 5.3 mmol/L    Chloride 102 98 - 107 mmol/L    Bicarbonate 27 21 - 32 mmol/L    Anion Gap 10 10 - 20 mmol/L    Urea Nitrogen 12 6 - 23 mg/dL    Creatinine 0.67 0.50 - 1.05 mg/dL    eGFR >90 >60 mL/min/1.73m*2    Calcium 7.8 (L) 8.6 - 10.3 mg/dL    Phosphorus 3.0 2.5 - 4.9 mg/dL    Albumin 3.4 3.4 - 5.0 g/dL   Magnesium   Result Value Ref Range    Magnesium 1.70 1.60 - 2.40 mg/dL   POCT GLUCOSE   Result Value Ref Range    POCT Glucose 78 74 - 99 mg/dL   POCT GLUCOSE   Result Value Ref Range    POCT Glucose 124 (H) 74 - 99 mg/dL     ECG 12 lead    Result Date: 5/9/2024  Marked sinus bradycardia with sinus arrhythmia Right bundle branch block T wave abnormality, consider inferolateral ischemia Abnormal ECG When compared with ECG of 07-MAY-2024 10:18, Previous ECG has undetermined rhythm, needs review Borderline criteria for Inferior infarct are no longer Present Non-specific change in ST segment in Inferior leads T wave inversion now evident  in Inferior leads    EEG    IMPRESSION Impression This vEEG is indicative of highly active generalized myoclonic status likely related to anoxic brain injury and severe diffuse encephalopathy. Patient is having myoclonic jerks in the head and arm correlated with burst of epileptiform activity on the EEG. A full report will be scanned into the patient's chart at a later time. This report has been interpreted and electronically signed by    Transthoracic Echo (TTE) Complete    Result Date: 5/7/2024   [facility], [address] [place] [address]              Tel [phone] and Fax [phone] TRANSTHORACIC ECHOCARDIOGRAM REPORT  Patient Name:      PIYUSH GROSS        Reading Physician:    [id number] Danyel Smith DO Study Date:        5/7/2024             Ordering Provider:    [id number] DAWNA REID MRN/PID:           [id number]             Fellow: Accession#:        [id number]         Nurse: Date of Birth/Age: 1950 / 74 years  Sonographer:          Venita Jon RDCS Gender:            F                    Additional Staff: Height:            157.48 cm            Admit Date:           5/6/2024 Weight:            75.30 kg             Admission Status:     Inpatient -                                                               Routine BSA / BMI:         1.77 m2 / 30.36      Encounter#:           [id number]                    kg/m2                                         Department Location:  [facility] ICU Blood Pressure: 133 /58 mmHg Study Type:    TRANSTHORACIC ECHO (TTE) COMPLETE Diagnosis/ICD: Cardiac arrest, cause unspecified-I46.9 Indication:    Cardiac Arrest CPT Code:      Echo Complete w Full Doppler-91050 Patient History: Pertinent History: HTN and Hyperlipidemia. Study Detail: The following Echo studies were performed: 2D, M-Mode, Doppler and               color flow. The patient is intubated.  PHYSICIAN INTERPRETATION: Left  Ventricle: The left ventricular systolic function is normal, with an estimated ejection fraction of 55-60%. The calculated ejection fraction is normal at 60 % using the Soto's Bi-plane MOD calculation. There are no regional wall motion abnormalities. The left ventricular cavity size is normal. The left ventricular septal wall thickness is mildly increased. There is normal left ventricular posterior wall thickness. Spectral Doppler shows an abnormal pattern of left ventricular diastolic filling. Left Atrium: The left atrium is severely dilated. Right Ventricle: The right ventricle is slightly enlarged. There is normal right ventricular global systolic function. Right Atrium: The right atrium is moderately dilated. Aortic Valve: The aortic valve is trileaflet. There is trivial aortic valve regurgitation. The peak instantaneous gradient of the aortic valve is 8.4 mmHg. The mean gradient of the aortic valve is 4.0 mmHg. Mitral Valve: The mitral valve is mildly thickened. There is trace mitral valve regurgitation. Tricuspid Valve: The tricuspid valve is structurally normal. There is trace to mild tricuspid regurgitation. The Doppler estimated RVSP is slightly elevated at 33.9 mmHg. Pulmonic Valve: The pulmonic valve is structurally normal. There is trace to mild pulmonic valve regurgitation. Pericardium: There is no pericardial effusion noted. Aorta: The aortic root is normal. There is upper limits of normal dilatation of the ascending aorta. The aortic root is at the upper limits of normal size. Systemic Veins: The inferior vena cava appears to be of normal size. In comparison to the previous echocardiogram(s): There are no prior studies on this patient for comparison purposes.  CONCLUSIONS:  1. Left ventricular systolic function is normal with a 55-60% estimated ejection fraction.  2. Spectral Doppler shows an abnormal pattern of left ventricular diastolic filling.  3. The left atrium is severely dilated.  4. The  right atrium is moderately dilated.  5. Slightly elevated RVSP. QUANTITATIVE DATA SUMMARY: 2D MEASUREMENTS:                           Normal Ranges: Ao Root d:     3.80 cm    (2.0-3.7cm) LAs:           4.00 cm    (2.7-4.0cm) IVSd:          1.20 cm    (0.6-1.1cm) LVPWd:         0.90 cm    (0.6-1.1cm) LVIDd:         4.80 cm    (3.9-5.9cm) LVIDs:         3.10 cm LV Mass Index: 103.0 g/m2 LV % FS        35.4 % LA VOLUME:                               Normal Ranges: LA Vol A4C:        98.9 ml    (22+/-6mL/m2) LA Vol A2C:        118.4 ml LA Vol BP:         109.0 ml LA Vol Index A4C:  56.0ml/m2 LA Vol Index A2C:  67.1 ml/m2 LA Vol Index BP:   61.7 ml/m2 LA Area A4C:       27.9 cm2 LA Area A2C:       30.3 cm2 LA Major Axis A4C: 6.7 cm LA Major Axis A2C: 6.6 cm LA Volume Index:   60.7 ml/m2 RA VOLUME BY A/L METHOD:                               Normal Ranges: RA Vol A4C:        94.7 ml    (8.3-19.5ml) RA Vol Index A4C:  53.6 ml/m2 RA Area A4C:       25.9 cm2 RA Major Axis A4C: 6.0 cm M-MODE MEASUREMENTS:                  Normal Ranges: Ao Root: 3.30 cm (2.0-3.7cm) LAs:     4.80 cm (2.7-4.0cm) AORTA MEASUREMENTS:                      Normal Ranges: Ao Sinus, d: 3.80 cm (2.1-3.5cm) Ao STJ, d:   2.92 cm (1.7-3.4cm) Asc Ao, d:   3.60 cm (2.1-3.4cm) LV SYSTOLIC FUNCTION BY 2D PLANIMETRY (MOD):                     Normal Ranges: EF-A4C View: 61.0 % (>=55%) EF-A2C View: 58.8 % EF-Biplane:  60.3 % LV DIASTOLIC FUNCTION:                               Normal Ranges: MV Peak E:        0.61 m/s    (0.7-1.2 m/s) MV Peak A:        0.42 m/s    (0.42-0.7 m/s) E/A Ratio:        1.43        (1.0-2.2) MV e'             0.06 m/s    (>8.0) MV lateral e'     0.06 m/s MV medial e'      0.04 m/s MV A Dur:         132.00 msec E/e' Ratio:       10.12       (<8.0) a'                0.06 m/s PulmV Sys Hector:    46.90 cm/s PulmV Rivera Hector:   44.90 cm/s PulmV S/D Hector:    1.00 PulmV A Revs Hector: 19.30 cm/s PulmV A Revs Dur: 103.00 msec MITRAL VALVE:                  Normal Ranges: MV DT: 230 msec (150-240msec) AORTIC VALVE:                                   Normal Ranges: AoV Vmax:                1.45 m/s (<=1.7m/s) AoV Peak P.4 mmHg (<20mmHg) AoV Mean P.0 mmHg (1.7-11.5mmHg) LVOT Max Hector:            0.70 m/s (<=1.1m/s) AoV VTI:                 25.70 cm (18-25cm) LVOT VTI:                14.90 cm LVOT Diameter:           2.00 cm  (1.8-2.4cm) AoV Area, VTI:           1.82 cm2 (2.5-5.5cm2) AoV Area,Vmax:           1.51 cm2 (2.5-4.5cm2) AoV Dimensionless Index: 0.58  RIGHT VENTRICLE: RV Basal 4.30 cm RV Mid   3.16 cm RV Major 6.9 cm TAPSE:   22.5 mm RV s'    0.14 m/s TRICUSPID VALVE/RVSP:                             Normal Ranges: Peak TR Velocity: 2.78 m/s Est. RA Pressure: 3 mmHg RV Syst Pressure: 33.9 mmHg (< 30mmHg) IVC Diam:         1.93 cm PULMONIC VALVE:                         Normal Ranges: PV Accel Time: 129 msec (>120ms) PV Max Hector:    0.6 m/s  (0.6-0.9m/s) PV Max P.3 mmHg PIEDV:         1.14 m/s PADP:          8.2 mmHg Pulmonary Veins: PulmV A Revs Dur: 103.00 msec PulmV A Revs Hector: 19.30 cm/s PulmV Rivera Hector:   44.90 cm/s PulmV S/D Hector:    1.00 PulmV Sys Hector:    46.90 cm/s  [id number] Danyel Smith DO Electronically signed on 2024 at 5:17:53 PM  ** Final **      This patient has a urinary catheter   Reason for the urinary catheter remaining today? critically ill patient who need accurate urinary output measurements and end-of-life care    This patient is intubated   Reason for patient to remain intubated today? 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 The patient was reportedly found unresponsive by a neighbor next to her  after being down for an unknown time. No reported bystander CPR.  EMS was called and she was pulseless, with initial rhythm VF. She was administered 2 rounds of epinephrine, 1 dose of amiodarone, and 3 attempts at defibrillation, the last of which was at 360 J. She was intubated in the field. She was taken to [facility] ED where EKG demonstrated  diffuse ST depressions, most notably inferolaterally.  The cath lab was activated, however, per interventional cardiology, due to poor neurological exam, she was not taken for intervention at that time. She was subsequently noted to be flexor posturing. CTH with surgical coiling in place, old lacunar R basal ganglia infarct, RF encephalomalacia, but no LVO or acute process. She was ultimately started on heparin gtt after review of imaging by NSGY given history of aneurysm, and per cardiology recommendations.  She was started on TTM and subsequently admitted to the ICU.      Neurological  Encephalopathy and concern for possible anoxic brain injury in setting of cardiac arrest  History of SAH 2/2 ruptured Pcomm aneurysm s/p coiling (11/2019)  Remains unresponsive  vEEG yesterday AM concerning for myoclonus status   -Serial pain and neurological assessments  -Neurology consulted to assist in post-arrest prognostication - appreciate input  -MRI brain post arrest day 3 per recommendations - plan to withdraw today will DC  -Continue Keppra   -Neuroprotective measures including but not limited to avoidance of hyperthermia, hypotension; correction of metabolic anomalies as able   -Underwent TTM per protocol with goal 36C, rewarmed overnight   -Wean sedation as able for neurological assessments     CV  VF arrest  NSTEMI  History of HTN  Troponin peaked 4287, now downtrending this AM  TTE 5/7  CONCLUSIONS:   1. Left ventricular systolic function is  normal with a 55-60% estimated ejection fraction.   2. Spectral Doppler shows an abnormal pattern of left ventricular diastolic filling.   3. The left atrium is severely dilated.   4. The right atrium is moderately dilated.   5. Slightly elevated RVSP.  -Cardiology consulted, appreciate recs  -ASA, high intensity statin, heparin per cardiology recommendations  -Continuous telemetry and NIBP monitoring   -Hold home antihypertensives for now     Pulmonary  Hypoxemic respiratory failure in setting of cardiopulmonary arrest , Intubated in field prior to arrival  -VAP bundle  -Wean ventilator settings as able     Renal  -Trend strict I/O  -Follow RFP/metabolic panel  -Replete electrolytes as indicated  -Will plan to adjust FWF due to hyponatremia     GI  -PPI for GI ppx  -Continue TF     Endocrine   -Follow metabolic panel  -POCT glucose/SSI as indicated     Hematologic     PLAN:  -Trend CBC  -Heparin gtt as above  -SCDs     ID  No leukocytosis, afebrile, no s/s of active infection  UA negative LE/nitrites   CXR 5/6 without consolidation   - Trend temps  - Trend WBCs       I spent 30 minutes of critical care time in the professional and overall care of this patient.    Discussed patient condition with sons David and Andrey regarding their mother's critically ill condition.  Plan for compassionate extubation @ 1300.  Changed code status to DNRCC prior to extubation to reflect the wishes of the patient and sons.      Henry May, APRN-CNP

## 2024-05-10 VITALS
BODY MASS INDEX: 27.66 KG/M2 | RESPIRATION RATE: 11 BRPM | HEIGHT: 65 IN | SYSTOLIC BLOOD PRESSURE: 86 MMHG | HEART RATE: 63 BPM | OXYGEN SATURATION: 52 % | TEMPERATURE: 97.5 F | WEIGHT: 166.01 LBS | DIASTOLIC BLOOD PRESSURE: 51 MMHG

## 2024-05-10 PROCEDURE — 99291 CRITICAL CARE FIRST HOUR: CPT | Performed by: NURSE PRACTITIONER

## 2024-05-10 PROCEDURE — 2500000004 HC RX 250 GENERAL PHARMACY W/ HCPCS (ALT 636 FOR OP/ED): Performed by: NURSE PRACTITIONER

## 2024-05-10 RX ORDER — MORPHINE SULFATE 2 MG/ML
2 INJECTION, SOLUTION INTRAMUSCULAR; INTRAVENOUS
Status: DISCONTINUED | OUTPATIENT
Start: 2024-05-10 | End: 2024-05-10 | Stop reason: HOSPADM

## 2024-05-10 RX ADMIN — MORPHINE SULFATE 2 MG: 2 INJECTION, SOLUTION INTRAMUSCULAR; INTRAVENOUS at 08:25

## 2024-05-10 RX ADMIN — MORPHINE SULFATE 2 MG: 2 INJECTION, SOLUTION INTRAMUSCULAR; INTRAVENOUS at 11:13

## 2024-05-10 RX ADMIN — MORPHINE SULFATE 2 MG: 2 INJECTION, SOLUTION INTRAMUSCULAR; INTRAVENOUS at 09:33

## 2024-05-10 ASSESSMENT — RESPIRATORY DISTRESS OBSERVATION SCALE (RDOS)
INVOLUNTARY NASAL FLARING: 2 - PRESENT
RESPIRATORY RATE PER MINUTE: 2 - >30 BREATHS
INVOLUNTARY NASAL FLARING: 0 - NONE
LOOK OF FEAR: 0 - NONE
RESTLESS NONPURPOSEFUL MOVEMENTS: 0 - NONE
RDOS TOTAL SCORE: 6
RESPIRATORY RATE PER MINUTE: 1 - 19-30 BREATHS
RDOS TOTAL SCORE: 5
ACCESSORY MUSCLE RISE IN CLAVICLE DURING INSPIRATION: 0 - NONE
HEART RATE PER MINUTE: 0 - <90 BEATS
INVOLUNTARY NASAL FLARING: 2 - PRESENT
HEART RATE PER MINUTE: 0 - <90 BEATS
HEART RATE PER MINUTE: 0 - <90 BEATS
PARADOXICAL BREATHING PATTERN: 2 - PRESENT
ACCESSORY MUSCLE RISE IN CLAVICLE DURING INSPIRATION: 0 - NONE
LOOK OF FEAR: 0 - NONE
PARADOXICAL BREATHING PATTERN: 2 - PRESENT
ACCESSORY MUSCLE RISE IN CLAVICLE DURING INSPIRATION: 0 - NONE
RESTLESS NONPURPOSEFUL MOVEMENTS: 0 - NONE
RESTLESS NONPURPOSEFUL MOVEMENTS: 0 - NONE
RDOS TOTAL SCORE: 5
GRUNTING AT END OF EXPIRATION: 0 - NONE
PARADOXICAL BREATHING PATTERN: 2 - PRESENT
GRUNTING AT END OF EXPIRATION: 2 - PRESENT
LOOK OF FEAR: 0 - NONE
GRUNTING AT END OF EXPIRATION: 0 - NONE
RESPIRATORY RATE PER MINUTE: 1 - 19-30 BREATHS

## 2024-05-10 NOTE — SIGNIFICANT EVENT
Assigned RNJYOTI assuming care of patient; patient now under comfort care measures upon Minor's initial/shift assessment.

## 2024-05-10 NOTE — PROGRESS NOTES
"Khadra Velazquez is a 74 y.o. female on day 4 of admission presenting with Cardiac arrest (Multi).    Subjective   REGIS reported       Objective     Physical Exam  Constitutional:       Comments: Unresponsive   Cardiovascular:      Rate and Rhythm: Normal rate and regular rhythm.   Pulmonary:      Effort: Bradypnea present.   Abdominal:      General: There is no distension.      Palpations: Abdomen is soft.   Skin:     General: Skin is warm and dry.      Capillary Refill: Capillary refill takes less than 2 seconds.   Neurological:      Comments: Unresponsive  Comatose   Psychiatric:      Comments: Unable to fully assess at this time         Last Recorded Vitals  Blood pressure 85/52, pulse 80, temperature 37.3 °C (99.1 °F), temperature source Temporal, resp. rate 16, height 1.651 m (5' 5\"), weight 75.3 kg (166 lb 0.1 oz), SpO2 (!) 60%.  Intake/Output last 3 Shifts:  I/O last 3 completed shifts:  In: 1471.7 (19.5 mL/kg) [I.V.:681.7 (9.1 mL/kg); NG/GT:540; IV Piggyback:250]  Out: 2465 (32.7 mL/kg) [Urine:2465 (0.9 mL/kg/hr)]  Weight: 75.3 kg     Relevant Results  Scheduled medications     Continuous medications  morphine, 4 mg/hr, Last Rate: 4 mg/hr (05/09/24 2305)      PRN medications  PRN medications: acetaminophen, fentaNYL, glycopyrrolate, haloperidol lactate, haloperidol lactate, LORazepam, oxygen    Results for orders placed or performed during the hospital encounter of 05/06/24 (from the past 24 hour(s))   POCT GLUCOSE   Result Value Ref Range    POCT Glucose 78 74 - 99 mg/dL   POCT GLUCOSE   Result Value Ref Range    POCT Glucose 124 (H) 74 - 99 mg/dL     EEG    Result Date: 5/9/2024  IMPRESSION Impression This vEEG is indicative of highly active generalized myoclonic status likely related to anoxic brain injury and severe diffuse encephalopathy. There is no significant improvement in the epileptogenicity from the prior day. A full report will be scanned into the patient's chart at a later time.. This report has " been interpreted and electronically signed by    ECG 12 lead    Result Date: 5/9/2024  Marked sinus bradycardia with sinus arrhythmia Right bundle branch block T wave abnormality, consider inferolateral ischemia Abnormal ECG When compared with ECG of 07-MAY-2024 10:18, Previous ECG has undetermined rhythm, needs review Borderline criteria for Inferior infarct are no longer Present Non-specific change in ST segment in Inferior leads T wave inversion now evident in Inferior leads       This patient has a urinary catheter   Reason for the urinary catheter remaining today? 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 The patient was reportedly found unresponsive by a neighbor next to her  after being down for an unknown time. No reported bystander CPR.  EMS was called and she was pulseless, with initial rhythm VF. She was administered 2 rounds of epinephrine, 1 dose of amiodarone, and 3 attempts at defibrillation, the last of which was at 360 J. She was intubated in the field. She was taken to the [facility] ED where EKG demonstrated  diffuse ST depressions, most notably inferolaterally.  The cath lab was activated, however, per interventional cardiology, due to poor neurological exam, she was not taken for intervention at that time. She was subsequently noted to be flexor posturing. CTH with surgical coiling in place, old lacunar R basal ganglia infarct, RF encephalomalacia, but no LVO or acute process. She was ultimately started on heparin gtt after review of imaging by NSGY given history of aneurysm, and per cardiology recommendations.  She was started on TTM and subsequently  admitted to the ICU.     Had discussion with sons David and Andrey regarding their mother's critically ill condition on 5/9 at bedside, all questions answered.  Changed code status to DNRCC and patient compassionately extubated 5/9 to reflect the wishes of the patient and family.    Plan:     - Continue morphine infusion per end of life orders  - As needed morphine, glycopyrrolate, haldol, tylenol, and lorazepam.  - Turn as needed to provide comfort  - Taylor to remain in place for comfort  - NPO  - No further lab draws  - Continue monitoring.           I spent 30 minutes of critical care time in the professional and overall care of this patient.      Henry May, APRN-CNP

## 2024-05-10 NOTE — NURSING NOTE
1241 Patient . Dr Edgar at bedside to pronounce.  1316 Mortality checklist complete, patient transported to [facility]. Patient belongings that were left in room given to security and son Sánchez contacted to  belongings from security.

## 2024-05-10 NOTE — PROGRESS NOTES
05/10/24 1343   Discharge Planning   Patient expects to be discharged to: patient        Patient .

## 2024-05-10 NOTE — SIGNIFICANT EVENT
Called to bedside for asystole on the monitor.  Patient had been made comfort care yesterday afternoon by family and had been maintained on a morphine drip.  On arrival patient was absent respirations or heart tones.      Time of death: 12:41    No family at bedside, will be contacted by ICU team.    Janette Manuel  is calling requesting today's RX's are sent to [facility]    Patient and  were seen today in clinic.  They are forget full and forgot that the living facility uses [facility] in [facility].

## 2024-05-10 NOTE — DISCHARGE SUMMARY
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 The patient was reportedly found unresponsive by a neighbor next to her  after being down for an unknown time. No reported bystander CPR.  EMS was called and she was pulseless, with initial rhythm VF. She was administered 2 rounds of epinephrine, 1 dose of amiodarone, and 3 attempts at defibrillation, the last of which was at 360 J. She was intubated in the field. She was taken to the [facility] ED where EKG demonstrated  diffuse ST depressions, most notably inferolaterally.  The cath lab was activated, however, per interventional cardiology, due to poor neurological exam, she was not taken for intervention at that time. She was subsequently noted to be flexor posturing. CTH with surgical coiling in place, old lacunar R basal ganglia infarct, RF encephalomalacia, but no LVO or acute process. She was ultimately started on heparin gtt after review of imaging by NSGY given history of aneurysm, and per cardiology recommendations.  She was started on TTM and subsequently admitted to the ICU.      Had discussion with sons David and Andrey regarding their mother's critically ill condition on 5/9 at bedside, all questions answered.  Changed code status to DNRCC and patient compassionately extubated 5/9 to reflect the wishes of the patient and family.     Plan:      - Continue morphine infusion per end of life orders  - As needed morphine, glycopyrrolate, haldol, tylenol, and lorazepam.  - Turn as needed to provide  comfort  - Taylor to remain in place for comfort  - NPO  - No further lab draws  - Continue monitoring.      Home Medications     Medication List      ASK your doctor about these medications     amLODIPine 10 mg tablet; Commonly known as: Norvasc; Take 1 tablet (10   mg) by mouth once daily.   lisinopril 20 mg tablet   Multi For Her 18 mg iron-600 mcg-40 mcg capsule; Generic drug:   multivitamin-min-iron-FA-vit K   polyethylene glycol 17 gram/dose powder; Commonly known as: Glycolax,   Miralax       Outpatient Follow-Up  No future appointments.    Henry May, APRN-CNP
